# Patient Record
Sex: FEMALE | Race: WHITE | Employment: FULL TIME | URBAN - METROPOLITAN AREA
[De-identification: names, ages, dates, MRNs, and addresses within clinical notes are randomized per-mention and may not be internally consistent; named-entity substitution may affect disease eponyms.]

---

## 2017-07-03 ENCOUNTER — OFFICE VISIT (OUTPATIENT)
Dept: FAMILY MEDICINE CLINIC | Age: 57
End: 2017-07-03

## 2017-07-03 VITALS
DIASTOLIC BLOOD PRESSURE: 82 MMHG | SYSTOLIC BLOOD PRESSURE: 118 MMHG | HEIGHT: 66 IN | BODY MASS INDEX: 24.62 KG/M2 | TEMPERATURE: 97.8 F | WEIGHT: 153.2 LBS | RESPIRATION RATE: 18 BRPM | HEART RATE: 70 BPM

## 2017-07-03 DIAGNOSIS — E78.5 HYPERLIPIDEMIA, UNSPECIFIED HYPERLIPIDEMIA TYPE: ICD-10-CM

## 2017-07-03 DIAGNOSIS — E78.5 HYPERLIPIDEMIA, UNSPECIFIED HYPERLIPIDEMIA TYPE: Primary | ICD-10-CM

## 2017-07-03 LAB
ALBUMIN SERPL-MCNC: 5 G/DL (ref 3.9–4.9)
ALP BLD-CCNC: 104 U/L (ref 40–130)
ALT SERPL-CCNC: 17 U/L (ref 0–33)
ANION GAP SERPL CALCULATED.3IONS-SCNC: 13 MEQ/L (ref 7–13)
AST SERPL-CCNC: 19 U/L (ref 0–35)
BASOPHILS ABSOLUTE: 0 K/UL (ref 0–0.2)
BASOPHILS RELATIVE PERCENT: 0.7 %
BILIRUB SERPL-MCNC: 0.4 MG/DL (ref 0–1.2)
BUN BLDV-MCNC: 21 MG/DL (ref 6–20)
CALCIUM SERPL-MCNC: 9.4 MG/DL (ref 8.6–10.2)
CHLORIDE BLD-SCNC: 104 MEQ/L (ref 98–107)
CHOLESTEROL, TOTAL: 289 MG/DL (ref 0–199)
CO2: 25 MEQ/L (ref 22–29)
CREAT SERPL-MCNC: 0.62 MG/DL (ref 0.5–0.9)
EOSINOPHILS ABSOLUTE: 0.1 K/UL (ref 0–0.7)
EOSINOPHILS RELATIVE PERCENT: 1.8 %
GFR AFRICAN AMERICAN: >60
GFR NON-AFRICAN AMERICAN: >60
GLOBULIN: 1.6 G/DL (ref 2.3–3.5)
GLUCOSE BLD-MCNC: 106 MG/DL (ref 74–109)
HCT VFR BLD CALC: 43.3 % (ref 37–47)
HDLC SERPL-MCNC: 55 MG/DL (ref 40–59)
HEMOGLOBIN: 14.7 G/DL (ref 12–16)
LDL CHOLESTEROL CALCULATED: 208 MG/DL (ref 0–129)
LYMPHOCYTES ABSOLUTE: 2 K/UL (ref 1–4.8)
LYMPHOCYTES RELATIVE PERCENT: 29.9 %
MCH RBC QN AUTO: 31.6 PG (ref 27–31.3)
MCHC RBC AUTO-ENTMCNC: 33.9 % (ref 33–37)
MCV RBC AUTO: 93.3 FL (ref 82–100)
MONOCYTES ABSOLUTE: 0.4 K/UL (ref 0.2–0.8)
MONOCYTES RELATIVE PERCENT: 6 %
NEUTROPHILS ABSOLUTE: 4.2 K/UL (ref 1.4–6.5)
NEUTROPHILS RELATIVE PERCENT: 61.6 %
PDW BLD-RTO: 12.6 % (ref 11.5–14.5)
PLATELET # BLD: 181 K/UL (ref 130–400)
POTASSIUM SERPL-SCNC: 4 MEQ/L (ref 3.5–5.1)
RBC # BLD: 4.65 M/UL (ref 4.2–5.4)
SODIUM BLD-SCNC: 142 MEQ/L (ref 132–144)
TOTAL PROTEIN: 6.6 G/DL (ref 6.4–8.1)
TRIGL SERPL-MCNC: 132 MG/DL (ref 0–200)
WBC # BLD: 6.8 K/UL (ref 4.8–10.8)

## 2017-07-03 PROCEDURE — 99213 OFFICE O/P EST LOW 20 MIN: CPT | Performed by: FAMILY MEDICINE

## 2017-07-03 RX ORDER — BUPROPION HYDROCHLORIDE 150 MG/1
150 TABLET, EXTENDED RELEASE ORAL 2 TIMES DAILY
Qty: 60 TABLET | Refills: 3 | Status: SHIPPED | OUTPATIENT
Start: 2017-07-03 | End: 2018-12-27

## 2017-07-03 RX ORDER — IBUPROFEN 800 MG/1
800 TABLET ORAL EVERY 6 HOURS PRN
Qty: 120 TABLET | Refills: 3 | Status: SHIPPED | OUTPATIENT
Start: 2017-07-03 | End: 2018-12-27 | Stop reason: SDUPTHER

## 2017-07-03 ASSESSMENT — ENCOUNTER SYMPTOMS
SHORTNESS OF BREATH: 0
ABDOMINAL PAIN: 0
COUGH: 0
CONSTIPATION: 0
DIARRHEA: 0
EYES NEGATIVE: 1
NAUSEA: 0

## 2017-07-07 RX ORDER — ATORVASTATIN CALCIUM 40 MG/1
40 TABLET, FILM COATED ORAL DAILY
Qty: 30 TABLET | Refills: 1 | Status: SHIPPED | OUTPATIENT
Start: 2017-07-07 | End: 2018-12-27

## 2018-10-09 ENCOUNTER — OFFICE VISIT (OUTPATIENT)
Dept: FAMILY MEDICINE CLINIC | Age: 58
End: 2018-10-09
Payer: COMMERCIAL

## 2018-10-09 VITALS
RESPIRATION RATE: 16 BRPM | HEART RATE: 83 BPM | TEMPERATURE: 98.9 F | BODY MASS INDEX: 25.54 KG/M2 | WEIGHT: 158.9 LBS | OXYGEN SATURATION: 96 % | SYSTOLIC BLOOD PRESSURE: 110 MMHG | DIASTOLIC BLOOD PRESSURE: 80 MMHG | HEIGHT: 66 IN

## 2018-10-09 DIAGNOSIS — J01.10 ACUTE NON-RECURRENT FRONTAL SINUSITIS: ICD-10-CM

## 2018-10-09 DIAGNOSIS — M54.16 LUMBAR RADICULOPATHY: ICD-10-CM

## 2018-10-09 DIAGNOSIS — H73.012 BULLOUS MYRINGITIS OF LEFT EAR: Primary | ICD-10-CM

## 2018-10-09 DIAGNOSIS — M54.50 ACUTE MIDLINE LOW BACK PAIN WITHOUT SCIATICA: ICD-10-CM

## 2018-10-09 DIAGNOSIS — M54.2 CERVICALGIA: ICD-10-CM

## 2018-10-09 PROCEDURE — 99214 OFFICE O/P EST MOD 30 MIN: CPT | Performed by: NURSE PRACTITIONER

## 2018-10-09 RX ORDER — PREDNISONE 20 MG/1
40 TABLET ORAL DAILY
Qty: 6 TABLET | Refills: 0 | Status: SHIPPED | OUTPATIENT
Start: 2018-10-09 | End: 2018-10-12

## 2018-10-09 RX ORDER — TIZANIDINE 2 MG/1
TABLET ORAL
Qty: 30 TABLET | Refills: 0 | Status: SHIPPED | OUTPATIENT
Start: 2018-10-09 | End: 2018-10-18 | Stop reason: ALTCHOICE

## 2018-10-09 RX ORDER — AMOXICILLIN AND CLAVULANATE POTASSIUM 875; 125 MG/1; MG/1
1 TABLET, FILM COATED ORAL 2 TIMES DAILY
Qty: 20 TABLET | Refills: 0 | Status: SHIPPED | OUTPATIENT
Start: 2018-10-09 | End: 2018-10-19

## 2018-10-09 ASSESSMENT — PATIENT HEALTH QUESTIONNAIRE - PHQ9
SUM OF ALL RESPONSES TO PHQ9 QUESTIONS 1 & 2: 0
2. FEELING DOWN, DEPRESSED OR HOPELESS: 0
1. LITTLE INTEREST OR PLEASURE IN DOING THINGS: 0
SUM OF ALL RESPONSES TO PHQ QUESTIONS 1-9: 0
SUM OF ALL RESPONSES TO PHQ QUESTIONS 1-9: 0

## 2018-10-10 ASSESSMENT — ENCOUNTER SYMPTOMS
COUGH: 1
SORE THROAT: 1
BACK PAIN: 1

## 2018-10-18 ENCOUNTER — OFFICE VISIT (OUTPATIENT)
Dept: FAMILY MEDICINE CLINIC | Age: 58
End: 2018-10-18
Payer: COMMERCIAL

## 2018-10-18 VITALS
TEMPERATURE: 97.7 F | WEIGHT: 159 LBS | BODY MASS INDEX: 25.55 KG/M2 | SYSTOLIC BLOOD PRESSURE: 118 MMHG | HEART RATE: 89 BPM | DIASTOLIC BLOOD PRESSURE: 78 MMHG | HEIGHT: 66 IN | RESPIRATION RATE: 20 BRPM

## 2018-10-18 DIAGNOSIS — H65.02 ACUTE SEROUS OTITIS MEDIA OF LEFT EAR, RECURRENCE NOT SPECIFIED: ICD-10-CM

## 2018-10-18 DIAGNOSIS — H73.012 BULLOUS MYRINGITIS OF LEFT EAR: Primary | ICD-10-CM

## 2018-10-18 DIAGNOSIS — H93.8X2 EAR FULLNESS, LEFT: ICD-10-CM

## 2018-10-18 DIAGNOSIS — R05.9 COUGH: ICD-10-CM

## 2018-10-18 PROCEDURE — 99213 OFFICE O/P EST LOW 20 MIN: CPT | Performed by: FAMILY MEDICINE

## 2018-10-18 ASSESSMENT — ENCOUNTER SYMPTOMS
SINUS PRESSURE: 0
EYE ITCHING: 0
SHORTNESS OF BREATH: 0
SORE THROAT: 0
DIARRHEA: 0
ABDOMINAL PAIN: 0
COUGH: 1
CONSTIPATION: 0
EYE DISCHARGE: 0

## 2018-10-18 NOTE — PROGRESS NOTES
Subjective  Ventura Moulton, 60 y.o. female presents today with:  Chief Complaint   Patient presents with    Ear Fullness     Pt in office being seen for a follow up 10/09/18 she seen Anuja Rutledge for left ear ear and a cough. She was rx'ed Augmentin and Medrol dose pack- no help. She reports that she is still having experiencing left ear fullness and a cough           HPI    Patient follow-up ear infection still complains of left ear plugged and blocked denies pain    No other questions and or concerns for today's visit      Review of Systems   Constitutional: Negative for appetite change, fatigue and fever. HENT: Negative for congestion, ear pain, sinus pressure and sore throat. Left ear fullness     Eyes: Negative for discharge and itching. Respiratory: Positive for cough. Negative for shortness of breath. Cardiovascular: Negative for chest pain and palpitations. Gastrointestinal: Negative for abdominal pain, constipation and diarrhea. Endocrine: Negative for polydipsia. Genitourinary: Negative for difficulty urinating. Musculoskeletal: Negative for gait problem. Skin: Negative. Neurological: Negative for dizziness. Hematological: Negative. Psychiatric/Behavioral: Negative. Past Medical History:   Diagnosis Date    H/O Kidney stone 11/10/2016    Hyperlipidemia     Kidney stone 11/10/2016    Post-menopause 11/10/2016     Past Surgical History:   Procedure Laterality Date    HYSTERECTOMY  2006    JONG Morse     Social History     Social History    Marital status:      Spouse name: N/A    Number of children: N/A    Years of education: N/A     Occupational History    Not on file.      Social History Main Topics    Smoking status: Current Every Day Smoker     Packs/day: 1.00     Years: 40.00     Types: Cigarettes    Smokeless tobacco: Never Used    Alcohol use No    Drug use: No    Sexual activity: No     Other Topics Concern    Not on file     Social History Narrative    No narrative on file     History reviewed. No pertinent family history. Allergies   Allergen Reactions    Adhesive Tape Itching     bumps    Sulfamethoxazole-Trimethoprim Diarrhea and Nausea And Vomiting     Current Outpatient Prescriptions   Medication Sig Dispense Refill    amoxicillin-clavulanate (AUGMENTIN) 875-125 MG per tablet Take 1 tablet by mouth 2 times daily for 10 days 20 tablet 0    atorvastatin (LIPITOR) 40 MG tablet Take 1 tablet by mouth daily 30 tablet 1    ibuprofen (ADVIL;MOTRIN) 800 MG tablet Take 1 tablet by mouth every 6 hours as needed for Pain 120 tablet 3    buPROPion (WELLBUTRIN SR) 150 MG extended release tablet Take 1 tablet by mouth 2 times daily 60 tablet 3    acetaminophen (TYLENOL) 500 MG tablet Take 500 mg by mouth daily       No current facility-administered medications for this visit. PMH, Surgical Hx, Family Hx, and Social Hx reviewed and updated. Health Maintenance reviewed. Objective    Vitals:    10/18/18 1508   BP: 118/78   Pulse: 89   Resp: 20   Temp: 97.7 °F (36.5 °C)   TempSrc: Temporal   Weight: 159 lb (72.1 kg)   Height: 5' 6\" (1.676 m)       Physical Exam   Constitutional: She is oriented to person, place, and time. She appears well-developed and well-nourished. HENT:   Head: Normocephalic. Mouth/Throat: Oropharynx is clear and moist.   Eyes: Pupils are equal, round, and reactive to light. Neck: Normal range of motion. Neck supple. No thyromegaly present. Cardiovascular: Normal rate and intact distal pulses. Exam reveals no gallop and no friction rub. No murmur heard. Pulmonary/Chest: Effort normal and breath sounds normal.   Abdominal: Soft. Bowel sounds are normal.   Musculoskeletal: Normal range of motion. Neurological: She is alert and oriented to person, place, and time. Skin: Skin is warm and dry. Psychiatric: She has a normal mood and affect.  Her behavior is normal.     She has evidence of

## 2018-12-27 ENCOUNTER — OFFICE VISIT (OUTPATIENT)
Dept: FAMILY MEDICINE CLINIC | Age: 58
End: 2018-12-27
Payer: COMMERCIAL

## 2018-12-27 VITALS
TEMPERATURE: 97.7 F | HEIGHT: 66 IN | SYSTOLIC BLOOD PRESSURE: 118 MMHG | DIASTOLIC BLOOD PRESSURE: 72 MMHG | WEIGHT: 163 LBS | HEART RATE: 77 BPM | BODY MASS INDEX: 26.2 KG/M2 | RESPIRATION RATE: 20 BRPM

## 2018-12-27 DIAGNOSIS — M54.42 BILATERAL LOW BACK PAIN WITH BILATERAL SCIATICA, UNSPECIFIED CHRONICITY: Primary | ICD-10-CM

## 2018-12-27 DIAGNOSIS — M79.10 MYALGIA: ICD-10-CM

## 2018-12-27 DIAGNOSIS — M54.41 BILATERAL LOW BACK PAIN WITH BILATERAL SCIATICA, UNSPECIFIED CHRONICITY: Primary | ICD-10-CM

## 2018-12-27 DIAGNOSIS — M70.61 TROCHANTERIC BURSITIS OF BOTH HIPS: ICD-10-CM

## 2018-12-27 DIAGNOSIS — M70.62 TROCHANTERIC BURSITIS OF BOTH HIPS: ICD-10-CM

## 2018-12-27 DIAGNOSIS — E78.5 HYPERLIPIDEMIA, UNSPECIFIED HYPERLIPIDEMIA TYPE: ICD-10-CM

## 2018-12-27 DIAGNOSIS — Z78.0 POST-MENOPAUSE: ICD-10-CM

## 2018-12-27 LAB
ALBUMIN SERPL-MCNC: 4.5 G/DL (ref 3.9–4.9)
ALP BLD-CCNC: 103 U/L (ref 40–130)
ALT SERPL-CCNC: 16 U/L (ref 0–33)
ANION GAP SERPL CALCULATED.3IONS-SCNC: 11 MEQ/L (ref 7–13)
AST SERPL-CCNC: 18 U/L (ref 0–35)
BASOPHILS ABSOLUTE: 0.1 K/UL (ref 0–0.2)
BASOPHILS RELATIVE PERCENT: 0.7 %
BILIRUB SERPL-MCNC: 0.3 MG/DL (ref 0–1.2)
BUN BLDV-MCNC: 15 MG/DL (ref 6–20)
CALCIUM SERPL-MCNC: 9.2 MG/DL (ref 8.6–10.2)
CHLORIDE BLD-SCNC: 100 MEQ/L (ref 98–107)
CHOLESTEROL, TOTAL: 251 MG/DL (ref 0–199)
CO2: 29 MEQ/L (ref 22–29)
CREAT SERPL-MCNC: 0.73 MG/DL (ref 0.5–0.9)
EOSINOPHILS ABSOLUTE: 0.1 K/UL (ref 0–0.7)
EOSINOPHILS RELATIVE PERCENT: 2 %
GFR AFRICAN AMERICAN: >60
GFR NON-AFRICAN AMERICAN: >60
GLOBULIN: 2.6 G/DL (ref 2.3–3.5)
GLUCOSE BLD-MCNC: 101 MG/DL (ref 74–109)
HCT VFR BLD CALC: 42.4 % (ref 37–47)
HDLC SERPL-MCNC: 50 MG/DL (ref 40–59)
HEMOGLOBIN: 14.6 G/DL (ref 12–16)
LDL CHOLESTEROL CALCULATED: 173 MG/DL (ref 0–129)
LYMPHOCYTES ABSOLUTE: 1.8 K/UL (ref 1–4.8)
LYMPHOCYTES RELATIVE PERCENT: 26.1 %
MCH RBC QN AUTO: 32.8 PG (ref 27–31.3)
MCHC RBC AUTO-ENTMCNC: 34.4 % (ref 33–37)
MCV RBC AUTO: 95.5 FL (ref 82–100)
MONOCYTES ABSOLUTE: 0.4 K/UL (ref 0.2–0.8)
MONOCYTES RELATIVE PERCENT: 5.3 %
NEUTROPHILS ABSOLUTE: 4.5 K/UL (ref 1.4–6.5)
NEUTROPHILS RELATIVE PERCENT: 65.9 %
PDW BLD-RTO: 12.4 % (ref 11.5–14.5)
PLATELET # BLD: 199 K/UL (ref 130–400)
POTASSIUM SERPL-SCNC: 4.5 MEQ/L (ref 3.5–5.1)
RBC # BLD: 4.44 M/UL (ref 4.2–5.4)
SODIUM BLD-SCNC: 140 MEQ/L (ref 132–144)
TOTAL PROTEIN: 7.1 G/DL (ref 6.4–8.1)
TRIGL SERPL-MCNC: 140 MG/DL (ref 0–200)
VITAMIN D 25-HYDROXY: 32.1 NG/ML (ref 30–100)
WBC # BLD: 6.9 K/UL (ref 4.8–10.8)

## 2018-12-27 PROCEDURE — 4004F PT TOBACCO SCREEN RCVD TLK: CPT | Performed by: FAMILY MEDICINE

## 2018-12-27 PROCEDURE — G8427 DOCREV CUR MEDS BY ELIG CLIN: HCPCS | Performed by: FAMILY MEDICINE

## 2018-12-27 PROCEDURE — G8419 CALC BMI OUT NRM PARAM NOF/U: HCPCS | Performed by: FAMILY MEDICINE

## 2018-12-27 PROCEDURE — 3017F COLORECTAL CA SCREEN DOC REV: CPT | Performed by: FAMILY MEDICINE

## 2018-12-27 PROCEDURE — G8484 FLU IMMUNIZE NO ADMIN: HCPCS | Performed by: FAMILY MEDICINE

## 2018-12-27 PROCEDURE — 99214 OFFICE O/P EST MOD 30 MIN: CPT | Performed by: FAMILY MEDICINE

## 2018-12-27 RX ORDER — IBUPROFEN 800 MG/1
800 TABLET ORAL EVERY 6 HOURS PRN
Qty: 120 TABLET | Refills: 3 | Status: ON HOLD | OUTPATIENT
Start: 2018-12-27 | End: 2019-09-29

## 2018-12-27 RX ORDER — TIZANIDINE 4 MG/1
4 TABLET ORAL 3 TIMES DAILY
Qty: 30 TABLET | Refills: 1 | Status: ON HOLD | OUTPATIENT
Start: 2018-12-27 | End: 2019-09-29

## 2018-12-27 RX ORDER — PREDNISONE 10 MG/1
TABLET ORAL
Qty: 51 TABLET | Refills: 0 | Status: SHIPPED | OUTPATIENT
Start: 2018-12-27 | End: 2019-01-06

## 2018-12-27 ASSESSMENT — ENCOUNTER SYMPTOMS
EYE DISCHARGE: 0
ABDOMINAL PAIN: 0
SHORTNESS OF BREATH: 0
SINUS PRESSURE: 0
DIARRHEA: 0
BACK PAIN: 1
CONSTIPATION: 0
SORE THROAT: 0
EYE ITCHING: 0
COUGH: 0

## 2018-12-27 NOTE — PROGRESS NOTES
pain over the bilateral greater trochanteric bursa is no swelling just inflammation mild inflammation of the musculature inferior lateral by inferior negative straight leg raises  Assessment & Plan    Diagnosis Orders   1. Bilateral low back pain with bilateral sciatica, unspecified chronicity  ibuprofen (ADVIL;MOTRIN) 800 MG tablet    XR LUMBAR SPINE (2-3 VIEWS)   2. Post-menopause  DEXA BONE DENSITY AXIAL SKELETON   3. Myalgia  Vitamin D 25 Hydroxy   4. Hyperlipidemia, unspecified hyperlipidemia type  CBC With Auto Differential    Comprehensive Metabolic Panel    Lipid Panel   5.  Trochanteric bursitis of both hips  XR LUMBAR SPINE (2-3 VIEWS)     Orders Placed This Encounter   Procedures    DEXA BONE DENSITY AXIAL SKELETON     Standing Status:   Future     Standing Expiration Date:   12/27/2019     Order Specific Question:   Reason for exam:     Answer:   screening    XR LUMBAR SPINE (2-3 VIEWS)     Order Specific Question:   Reason for exam:     Answer:   low back pain    Vitamin D 25 Hydroxy     Standing Status:   Future     Number of Occurrences:   1     Standing Expiration Date:   12/27/2019    CBC With Auto Differential     Standing Status:   Future     Number of Occurrences:   1     Standing Expiration Date:   12/27/2019    Comprehensive Metabolic Panel     Standing Status:   Future     Number of Occurrences:   1     Standing Expiration Date:   12/27/2019    Lipid Panel     Standing Status:   Future     Number of Occurrences:   1     Standing Expiration Date:   12/27/2019     Order Specific Question:   Is Patient Fasting?/# of Hours     Answer:   na     Orders Placed This Encounter   Medications    ibuprofen (ADVIL;MOTRIN) 800 MG tablet     Sig: Take 1 tablet by mouth every 6 hours as needed for Pain     Dispense:  120 tablet     Refill:  3     Medications Discontinued During This Encounter   Medication Reason    atorvastatin (LIPITOR) 40 MG tablet Paradoxical response    buPROPion (WELLBUTRIN SR)

## 2018-12-28 ENCOUNTER — TELEPHONE (OUTPATIENT)
Dept: FAMILY MEDICINE CLINIC | Age: 58
End: 2018-12-28

## 2018-12-28 NOTE — TELEPHONE ENCOUNTER
Pt is calling back in regards to the following message:      Cholesterol better than last time but still markedly elevated.  Recommend starting Lipitor once a day      Pt aware.  Would like the Lipitor sent to [facility] on [address]  Please create med with dosage and directions and send to pharmacy  Aware you are out of the office today

## 2018-12-29 RX ORDER — ATORVASTATIN CALCIUM 40 MG/1
40 TABLET, FILM COATED ORAL DAILY
Qty: 30 TABLET | Refills: 3 | Status: ON HOLD | OUTPATIENT
Start: 2018-12-29 | End: 2019-09-29

## 2019-01-14 ENCOUNTER — HOSPITAL ENCOUNTER (OUTPATIENT)
Dept: WOMENS IMAGING | Age: 59
Discharge: HOME | End: 2019-01-16
Payer: COMMERCIAL

## 2019-01-14 DIAGNOSIS — Z78.0 POST-MENOPAUSE: ICD-10-CM

## 2019-01-14 PROCEDURE — 77080 DXA BONE DENSITY AXIAL: CPT

## 2019-01-22 ENCOUNTER — TELEPHONE (OUTPATIENT)
Dept: FAMILY MEDICINE CLINIC | Age: 59
End: 2019-01-22

## 2019-01-23 ENCOUNTER — TELEPHONE (OUTPATIENT)
Dept: FAMILY MEDICINE CLINIC | Age: 59
End: 2019-01-23

## 2019-01-23 ENCOUNTER — OFFICE VISIT (OUTPATIENT)
Dept: FAMILY MEDICINE CLINIC | Age: 59
End: 2019-01-23
Payer: COMMERCIAL

## 2019-01-23 VITALS
HEART RATE: 73 BPM | SYSTOLIC BLOOD PRESSURE: 118 MMHG | BODY MASS INDEX: 27 KG/M2 | HEIGHT: 66 IN | TEMPERATURE: 97.3 F | DIASTOLIC BLOOD PRESSURE: 64 MMHG | WEIGHT: 168 LBS | RESPIRATION RATE: 16 BRPM

## 2019-01-23 DIAGNOSIS — M85.80 OSTEOPENIA, UNSPECIFIED LOCATION: ICD-10-CM

## 2019-01-23 DIAGNOSIS — E78.49 OTHER HYPERLIPIDEMIA: Primary | ICD-10-CM

## 2019-01-23 DIAGNOSIS — R31.9 HEMATURIA, UNSPECIFIED TYPE: ICD-10-CM

## 2019-01-23 DIAGNOSIS — N39.43 DRIBBLING OF URINE: ICD-10-CM

## 2019-01-23 LAB
BILIRUBIN, POC: NORMAL
BLOOD URINE, POC: NORMAL
CLARITY, POC: CLEAR
COLOR, POC: NORMAL
GLUCOSE URINE, POC: NORMAL
KETONES, POC: NORMAL
LEUKOCYTE EST, POC: NORMAL
NITRITE, POC: NORMAL
PH, POC: 6
PROTEIN, POC: NORMAL
SPECIFIC GRAVITY, POC: 1.01
UROBILINOGEN, POC: NORMAL

## 2019-01-23 PROCEDURE — 74176 CT ABD & PELVIS W/O CONTRAST: CPT | Performed by: FAMILY MEDICINE

## 2019-01-23 PROCEDURE — 4004F PT TOBACCO SCREEN RCVD TLK: CPT | Performed by: FAMILY MEDICINE

## 2019-01-23 PROCEDURE — 99214 OFFICE O/P EST MOD 30 MIN: CPT | Performed by: FAMILY MEDICINE

## 2019-01-23 PROCEDURE — 81002 URINALYSIS NONAUTO W/O SCOPE: CPT | Performed by: FAMILY MEDICINE

## 2019-01-23 PROCEDURE — G8427 DOCREV CUR MEDS BY ELIG CLIN: HCPCS | Performed by: FAMILY MEDICINE

## 2019-01-23 PROCEDURE — G8484 FLU IMMUNIZE NO ADMIN: HCPCS | Performed by: FAMILY MEDICINE

## 2019-01-23 PROCEDURE — G8419 CALC BMI OUT NRM PARAM NOF/U: HCPCS | Performed by: FAMILY MEDICINE

## 2019-01-23 PROCEDURE — 3017F COLORECTAL CA SCREEN DOC REV: CPT | Performed by: FAMILY MEDICINE

## 2019-01-23 RX ORDER — RISEDRONATE SODIUM 150 MG/1
150 TABLET, FILM COATED ORAL
Qty: 3 TABLET | Refills: 3 | Status: ON HOLD | OUTPATIENT
Start: 2019-01-23 | End: 2019-09-29

## 2019-01-23 ASSESSMENT — ENCOUNTER SYMPTOMS
SINUS PRESSURE: 0
DIARRHEA: 0
EYE ITCHING: 0
SORE THROAT: 0
SHORTNESS OF BREATH: 0
CONSTIPATION: 0
ABDOMINAL PAIN: 0
COUGH: 0
EYE DISCHARGE: 0

## 2019-02-02 ENCOUNTER — HOSPITAL ENCOUNTER (OUTPATIENT)
Dept: CT IMAGING | Age: 59
Discharge: HOME | End: 2019-02-04
Payer: COMMERCIAL

## 2019-02-02 DIAGNOSIS — R31.9 HEMATURIA, UNSPECIFIED TYPE: ICD-10-CM

## 2019-02-02 PROCEDURE — 74176 CT ABD & PELVIS W/O CONTRAST: CPT

## 2019-02-05 ENCOUNTER — TELEPHONE (OUTPATIENT)
Dept: FAMILY MEDICINE CLINIC | Age: 59
End: 2019-02-05

## 2019-02-07 ENCOUNTER — TELEPHONE (OUTPATIENT)
Dept: FAMILY MEDICINE CLINIC | Age: 59
End: 2019-02-07

## 2019-02-07 RX ORDER — BUPROPION HYDROCHLORIDE 150 MG/1
150 TABLET, EXTENDED RELEASE ORAL 2 TIMES DAILY
Qty: 60 TABLET | Refills: 3 | Status: ON HOLD | OUTPATIENT
Start: 2019-02-07 | End: 2019-09-29

## 2019-02-13 ENCOUNTER — TELEPHONE (OUTPATIENT)
Dept: FAMILY MEDICINE CLINIC | Age: 59
End: 2019-02-13

## 2019-02-13 DIAGNOSIS — R16.0 LIVER MASS: Primary | ICD-10-CM

## 2019-02-24 ENCOUNTER — HOSPITAL ENCOUNTER (OUTPATIENT)
Dept: ULTRASOUND IMAGING | Age: 59
Discharge: HOME | End: 2019-02-26
Payer: COMMERCIAL

## 2019-02-24 DIAGNOSIS — R16.0 LIVER MASS: ICD-10-CM

## 2019-02-24 PROCEDURE — 76830 TRANSVAGINAL US NON-OB: CPT

## 2019-02-24 PROCEDURE — 76856 US EXAM PELVIC COMPLETE: CPT

## 2019-02-24 PROCEDURE — 76705 ECHO EXAM OF ABDOMEN: CPT

## 2019-02-26 ENCOUNTER — TELEPHONE (OUTPATIENT)
Dept: FAMILY MEDICINE CLINIC | Age: 59
End: 2019-02-26

## 2019-03-13 ENCOUNTER — TELEPHONE (OUTPATIENT)
Dept: OTHER | Facility: CLINIC | Age: 59
End: 2019-03-13

## 2019-09-29 ENCOUNTER — APPOINTMENT (OUTPATIENT)
Dept: GENERAL RADIOLOGY | Age: 59
DRG: 103 | End: 2019-09-29
Payer: COMMERCIAL

## 2019-09-29 ENCOUNTER — APPOINTMENT (OUTPATIENT)
Dept: CT IMAGING | Age: 59
DRG: 103 | End: 2019-09-29
Payer: COMMERCIAL

## 2019-09-29 ENCOUNTER — HOSPITAL ENCOUNTER (INPATIENT)
Age: 59
LOS: 1 days | Discharge: LEFT BEFORE DISCHARGE | DRG: 103 | End: 2019-09-30
Attending: INTERNAL MEDICINE | Admitting: INTERNAL MEDICINE
Payer: COMMERCIAL

## 2019-09-29 DIAGNOSIS — R51.9 INTRACTABLE HEADACHE, UNSPECIFIED CHRONICITY PATTERN, UNSPECIFIED HEADACHE TYPE: Primary | ICD-10-CM

## 2019-09-29 PROBLEM — G43.819 MIGRAINE VARIANT, INTRACTABLE: Status: ACTIVE | Noted: 2019-09-29

## 2019-09-29 LAB
ALBUMIN SERPL-MCNC: 4.4 G/DL (ref 3.5–4.6)
ALP BLD-CCNC: 97 U/L (ref 40–130)
ALT SERPL-CCNC: 16 U/L (ref 0–33)
AMPHETAMINE SCREEN, URINE: NORMAL
ANION GAP SERPL CALCULATED.3IONS-SCNC: 14 MEQ/L (ref 9–15)
APTT: 28.1 SEC (ref 24.4–36.8)
AST SERPL-CCNC: 25 U/L (ref 0–35)
BARBITURATE SCREEN URINE: NORMAL
BASOPHILS ABSOLUTE: 0.1 K/UL (ref 0–0.2)
BASOPHILS RELATIVE PERCENT: 1.2 %
BENZODIAZEPINE SCREEN, URINE: NORMAL
BILIRUB SERPL-MCNC: <0.2 MG/DL (ref 0.2–0.7)
BUN BLDV-MCNC: 11 MG/DL (ref 8–23)
CALCIUM SERPL-MCNC: 9.4 MG/DL (ref 8.5–9.9)
CANNABINOID SCREEN URINE: NORMAL
CHLORIDE BLD-SCNC: 103 MEQ/L (ref 95–107)
CO2: 25 MEQ/L (ref 20–31)
COCAINE METABOLITE SCREEN URINE: NORMAL
CREAT SERPL-MCNC: 0.59 MG/DL (ref 0.5–0.9)
EKG ATRIAL RATE: 76 BPM
EKG P AXIS: 73 DEGREES
EKG P-R INTERVAL: 208 MS
EKG Q-T INTERVAL: 404 MS
EKG QRS DURATION: 84 MS
EKG QTC CALCULATION (BAZETT): 454 MS
EKG R AXIS: 76 DEGREES
EKG T AXIS: 60 DEGREES
EKG VENTRICULAR RATE: 76 BPM
EOSINOPHILS ABSOLUTE: 0.1 K/UL (ref 0–0.7)
EOSINOPHILS RELATIVE PERCENT: 1.4 %
ETHANOL PERCENT: NORMAL G/DL
ETHANOL: <10 MG/DL (ref 0–0.08)
GFR AFRICAN AMERICAN: >60
GFR NON-AFRICAN AMERICAN: >60
GLOBULIN: 2.6 G/DL (ref 2.3–3.5)
GLUCOSE BLD-MCNC: 140 MG/DL (ref 60–115)
GLUCOSE BLD-MCNC: 150 MG/DL (ref 70–99)
HCT VFR BLD CALC: 41.7 % (ref 37–47)
HEMOGLOBIN: 14.7 G/DL (ref 12–16)
INR BLD: 0.9
LYMPHOCYTES ABSOLUTE: 2.1 K/UL (ref 1–4.8)
LYMPHOCYTES RELATIVE PERCENT: 26.4 %
Lab: NORMAL
MAGNESIUM: 1.7 MG/DL (ref 1.7–2.4)
MCH RBC QN AUTO: 32.9 PG (ref 27–31.3)
MCHC RBC AUTO-ENTMCNC: 35.2 % (ref 33–37)
MCV RBC AUTO: 93.5 FL (ref 82–100)
METHADONE SCREEN, URINE: NORMAL
MONOCYTES ABSOLUTE: 0.4 K/UL (ref 0.2–0.8)
MONOCYTES RELATIVE PERCENT: 4.5 %
NEUTROPHILS ABSOLUTE: 5.2 K/UL (ref 1.4–6.5)
NEUTROPHILS RELATIVE PERCENT: 66.5 %
OPIATE SCREEN URINE: NORMAL
OXYCODONE URINE: NORMAL
PDW BLD-RTO: 12.8 % (ref 11.5–14.5)
PERFORMED ON: ABNORMAL
PHENCYCLIDINE SCREEN URINE: NORMAL
PLATELET # BLD: 195 K/UL (ref 130–400)
POTASSIUM SERPL-SCNC: 3.6 MEQ/L (ref 3.4–4.9)
PROPOXYPHENE SCREEN: NORMAL
PROTHROMBIN TIME: 12 SEC (ref 12.3–14.9)
RBC # BLD: 4.46 M/UL (ref 4.2–5.4)
SODIUM BLD-SCNC: 142 MEQ/L (ref 135–144)
TOTAL CK: 44 U/L (ref 0–170)
TOTAL PROTEIN: 7 G/DL (ref 6.3–8)
TROPONIN: <0.01 NG/ML (ref 0–0.01)
WBC # BLD: 7.9 K/UL (ref 4.8–10.8)

## 2019-09-29 PROCEDURE — 85025 COMPLETE CBC W/AUTO DIFF WBC: CPT

## 2019-09-29 PROCEDURE — 96375 TX/PRO/DX INJ NEW DRUG ADDON: CPT

## 2019-09-29 PROCEDURE — 82550 ASSAY OF CK (CPK): CPT

## 2019-09-29 PROCEDURE — 99285 EMERGENCY DEPT VISIT HI MDM: CPT

## 2019-09-29 PROCEDURE — 85610 PROTHROMBIN TIME: CPT

## 2019-09-29 PROCEDURE — 96365 THER/PROPH/DIAG IV INF INIT: CPT

## 2019-09-29 PROCEDURE — 93005 ELECTROCARDIOGRAM TRACING: CPT | Performed by: NURSE PRACTITIONER

## 2019-09-29 PROCEDURE — 36415 COLL VENOUS BLD VENIPUNCTURE: CPT

## 2019-09-29 PROCEDURE — 70450 CT HEAD/BRAIN W/O DYE: CPT

## 2019-09-29 PROCEDURE — G0378 HOSPITAL OBSERVATION PER HR: HCPCS

## 2019-09-29 PROCEDURE — 84484 ASSAY OF TROPONIN QUANT: CPT

## 2019-09-29 PROCEDURE — 83735 ASSAY OF MAGNESIUM: CPT

## 2019-09-29 PROCEDURE — 87086 URINE CULTURE/COLONY COUNT: CPT

## 2019-09-29 PROCEDURE — G0480 DRUG TEST DEF 1-7 CLASSES: HCPCS

## 2019-09-29 PROCEDURE — 6360000002 HC RX W HCPCS: Performed by: NURSE PRACTITIONER

## 2019-09-29 PROCEDURE — 2580000003 HC RX 258: Performed by: NURSE PRACTITIONER

## 2019-09-29 PROCEDURE — 71045 X-RAY EXAM CHEST 1 VIEW: CPT

## 2019-09-29 PROCEDURE — 80307 DRUG TEST PRSMV CHEM ANLYZR: CPT

## 2019-09-29 PROCEDURE — 85730 THROMBOPLASTIN TIME PARTIAL: CPT

## 2019-09-29 PROCEDURE — 96366 THER/PROPH/DIAG IV INF ADDON: CPT

## 2019-09-29 PROCEDURE — 80053 COMPREHEN METABOLIC PANEL: CPT

## 2019-09-29 PROCEDURE — 1210000000 HC MED SURG R&B

## 2019-09-29 RX ORDER — METOCLOPRAMIDE HYDROCHLORIDE 5 MG/ML
10 INJECTION INTRAMUSCULAR; INTRAVENOUS ONCE
Status: COMPLETED | OUTPATIENT
Start: 2019-09-29 | End: 2019-09-30

## 2019-09-29 RX ORDER — SODIUM CHLORIDE 0.9 % (FLUSH) 0.9 %
10 SYRINGE (ML) INJECTION EVERY 12 HOURS SCHEDULED
Status: DISCONTINUED | OUTPATIENT
Start: 2019-09-29 | End: 2019-09-30 | Stop reason: HOSPADM

## 2019-09-29 RX ORDER — MAGNESIUM SULFATE IN WATER 40 MG/ML
2 INJECTION, SOLUTION INTRAVENOUS ONCE
Status: COMPLETED | OUTPATIENT
Start: 2019-09-29 | End: 2019-09-29

## 2019-09-29 RX ORDER — ONDANSETRON 2 MG/ML
4 INJECTION INTRAMUSCULAR; INTRAVENOUS EVERY 6 HOURS PRN
Status: DISCONTINUED | OUTPATIENT
Start: 2019-09-29 | End: 2019-09-30 | Stop reason: HOSPADM

## 2019-09-29 RX ORDER — CETIRIZINE HYDROCHLORIDE 10 MG/1
10 TABLET ORAL DAILY
Status: DISCONTINUED | OUTPATIENT
Start: 2019-09-30 | End: 2019-09-30 | Stop reason: HOSPADM

## 2019-09-29 RX ORDER — CYCLOBENZAPRINE HCL 5 MG
5 TABLET ORAL NIGHTLY
Status: DISCONTINUED | OUTPATIENT
Start: 2019-09-29 | End: 2019-09-30 | Stop reason: HOSPADM

## 2019-09-29 RX ORDER — ATORVASTATIN CALCIUM 40 MG/1
40 TABLET, FILM COATED ORAL DAILY
Status: DISCONTINUED | OUTPATIENT
Start: 2019-09-30 | End: 2019-09-29

## 2019-09-29 RX ORDER — 0.9 % SODIUM CHLORIDE 0.9 %
1000 INTRAVENOUS SOLUTION INTRAVENOUS ONCE
Status: COMPLETED | OUTPATIENT
Start: 2019-09-29 | End: 2019-09-29

## 2019-09-29 RX ORDER — DIPHENHYDRAMINE HYDROCHLORIDE 50 MG/ML
25 INJECTION INTRAMUSCULAR; INTRAVENOUS ONCE
Status: COMPLETED | OUTPATIENT
Start: 2019-09-29 | End: 2019-09-29

## 2019-09-29 RX ORDER — KETOROLAC TROMETHAMINE 30 MG/ML
30 INJECTION, SOLUTION INTRAMUSCULAR; INTRAVENOUS ONCE
Status: COMPLETED | OUTPATIENT
Start: 2019-09-29 | End: 2019-09-29

## 2019-09-29 RX ORDER — BUPROPION HYDROCHLORIDE 150 MG/1
150 TABLET, EXTENDED RELEASE ORAL 2 TIMES DAILY
Status: DISCONTINUED | OUTPATIENT
Start: 2019-09-30 | End: 2019-09-29

## 2019-09-29 RX ORDER — METOCLOPRAMIDE HYDROCHLORIDE 5 MG/ML
10 INJECTION INTRAMUSCULAR; INTRAVENOUS ONCE
Status: COMPLETED | OUTPATIENT
Start: 2019-09-29 | End: 2019-09-29

## 2019-09-29 RX ORDER — IBUPROFEN 200 MG
200 TABLET ORAL 2 TIMES DAILY PRN
COMMUNITY

## 2019-09-29 RX ORDER — SODIUM CHLORIDE 0.9 % (FLUSH) 0.9 %
10 SYRINGE (ML) INJECTION PRN
Status: DISCONTINUED | OUTPATIENT
Start: 2019-09-29 | End: 2019-09-30 | Stop reason: HOSPADM

## 2019-09-29 RX ORDER — DIPHENHYDRAMINE HYDROCHLORIDE 50 MG/ML
25 INJECTION INTRAMUSCULAR; INTRAVENOUS ONCE
Status: COMPLETED | OUTPATIENT
Start: 2019-09-29 | End: 2019-09-30

## 2019-09-29 RX ORDER — SUMATRIPTAN 6 MG/.5ML
6 INJECTION, SOLUTION SUBCUTANEOUS ONCE
Status: COMPLETED | OUTPATIENT
Start: 2019-09-29 | End: 2019-09-30

## 2019-09-29 RX ADMIN — DIPHENHYDRAMINE HYDROCHLORIDE 25 MG: 50 INJECTION, SOLUTION INTRAMUSCULAR; INTRAVENOUS at 18:40

## 2019-09-29 RX ADMIN — MAGNESIUM SULFATE HEPTAHYDRATE 2 G: 40 INJECTION, SOLUTION INTRAVENOUS at 20:22

## 2019-09-29 RX ADMIN — SODIUM CHLORIDE 1000 ML: 9 INJECTION, SOLUTION INTRAVENOUS at 18:40

## 2019-09-29 RX ADMIN — KETOROLAC TROMETHAMINE 30 MG: 30 INJECTION, SOLUTION INTRAMUSCULAR at 20:22

## 2019-09-29 RX ADMIN — METOCLOPRAMIDE 10 MG: 5 INJECTION, SOLUTION INTRAMUSCULAR; INTRAVENOUS at 18:40

## 2019-09-29 ASSESSMENT — ENCOUNTER SYMPTOMS
ABDOMINAL PAIN: 0
DIARRHEA: 0
COUGH: 0
PHOTOPHOBIA: 0
VOMITING: 0
SORE THROAT: 0
NAUSEA: 1
SHORTNESS OF BREATH: 0
RHINORRHEA: 0
BACK PAIN: 0
EYE PAIN: 0

## 2019-09-29 ASSESSMENT — PAIN DESCRIPTION - PAIN TYPE
TYPE: ACUTE PAIN

## 2019-09-29 ASSESSMENT — PAIN DESCRIPTION - LOCATION
LOCATION: HEAD

## 2019-09-29 ASSESSMENT — PAIN SCALES - GENERAL
PAINLEVEL_OUTOF10: 10

## 2019-09-29 ASSESSMENT — PAIN DESCRIPTION - FREQUENCY
FREQUENCY: CONTINUOUS

## 2019-09-29 ASSESSMENT — PAIN DESCRIPTION - DESCRIPTORS
DESCRIPTORS: HEADACHE
DESCRIPTORS: HEADACHE;ACHING;PRESSURE
DESCRIPTORS: HEADACHE

## 2019-09-30 VITALS
BODY MASS INDEX: 26.52 KG/M2 | TEMPERATURE: 99 F | OXYGEN SATURATION: 96 % | WEIGHT: 165 LBS | HEART RATE: 75 BPM | HEIGHT: 66 IN | RESPIRATION RATE: 16 BRPM | DIASTOLIC BLOOD PRESSURE: 65 MMHG | SYSTOLIC BLOOD PRESSURE: 107 MMHG

## 2019-09-30 LAB
INR BLD: 1
PERFORMED ON: NORMAL
POC SAMPLE TYPE: NORMAL
PROTHROMBIN TIME, POC: 12.5 SEC (ref 9.5–12.5)

## 2019-09-30 PROCEDURE — 2580000003 HC RX 258: Performed by: INTERNAL MEDICINE

## 2019-09-30 PROCEDURE — G0378 HOSPITAL OBSERVATION PER HR: HCPCS

## 2019-09-30 PROCEDURE — 6360000002 HC RX W HCPCS: Performed by: INTERNAL MEDICINE

## 2019-09-30 PROCEDURE — 96372 THER/PROPH/DIAG INJ SC/IM: CPT

## 2019-09-30 PROCEDURE — 6370000000 HC RX 637 (ALT 250 FOR IP): Performed by: INTERNAL MEDICINE

## 2019-09-30 PROCEDURE — 96376 TX/PRO/DX INJ SAME DRUG ADON: CPT

## 2019-09-30 RX ORDER — CYCLOBENZAPRINE HCL 5 MG
5 TABLET ORAL NIGHTLY
Qty: 10 TABLET | Refills: 0 | Status: SHIPPED | OUTPATIENT
Start: 2019-09-30 | End: 2019-10-10

## 2019-09-30 RX ADMIN — SUMATRIPTAN SUCCINATE 6 MG: 6 INJECTION SUBCUTANEOUS at 00:08

## 2019-09-30 RX ADMIN — Medication 10 ML: at 00:09

## 2019-09-30 RX ADMIN — CYCLOBENZAPRINE HYDROCHLORIDE 5 MG: 5 TABLET, FILM COATED ORAL at 00:08

## 2019-09-30 RX ADMIN — DIPHENHYDRAMINE HYDROCHLORIDE 25 MG: 50 INJECTION, SOLUTION INTRAMUSCULAR; INTRAVENOUS at 00:07

## 2019-09-30 RX ADMIN — CETIRIZINE HYDROCHLORIDE 10 MG: 10 TABLET, FILM COATED ORAL at 09:16

## 2019-09-30 RX ADMIN — METOCLOPRAMIDE 10 MG: 5 INJECTION, SOLUTION INTRAMUSCULAR; INTRAVENOUS at 00:07

## 2019-10-01 LAB — URINE CULTURE, ROUTINE: NORMAL

## 2019-10-01 PROCEDURE — 93010 ELECTROCARDIOGRAM REPORT: CPT | Performed by: INTERNAL MEDICINE

## 2023-04-14 ENCOUNTER — HOSPITAL ENCOUNTER (EMERGENCY)
Age: 63
Discharge: HOME | End: 2023-04-15
Attending: EMERGENCY MEDICINE
Payer: COMMERCIAL
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
## 2023-04-14 DIAGNOSIS — S52.501A CLOSED FRACTURE OF DISTAL END OF RIGHT RADIUS, UNSPECIFIED FRACTURE MORPHOLOGY, INITIAL ENCOUNTER: ICD-10-CM

## 2023-04-14 PROCEDURE — 99285 EMERGENCY DEPT VISIT HI MDM: CPT

## 2023-04-14 ASSESSMENT — PAIN DESCRIPTION - ORIENTATION: ORIENTATION: RIGHT

## 2023-04-14 ASSESSMENT — PAIN DESCRIPTION - DESCRIPTORS: DESCRIPTORS: DISCOMFORT

## 2023-04-14 ASSESSMENT — PAIN SCALES - GENERAL: PAINLEVEL_OUTOF10: 10

## 2023-04-14 ASSESSMENT — LIFESTYLE VARIABLES: HOW OFTEN DO YOU HAVE A DRINK CONTAINING ALCOHOL: NEVER

## 2023-04-14 ASSESSMENT — PAIN DESCRIPTION - PAIN TYPE: TYPE: ACUTE PAIN

## 2023-04-14 ASSESSMENT — PAIN DESCRIPTION - LOCATION: LOCATION: WRIST

## 2023-04-15 ENCOUNTER — APPOINTMENT (OUTPATIENT)
Dept: GENERAL RADIOLOGY | Age: 63
End: 2023-04-15
Payer: COMMERCIAL

## 2023-04-15 VITALS
WEIGHT: 170 LBS | HEART RATE: 73 BPM | SYSTOLIC BLOOD PRESSURE: 147 MMHG | TEMPERATURE: 98 F | BODY MASS INDEX: 27.32 KG/M2 | HEIGHT: 66 IN | RESPIRATION RATE: 18 BRPM | DIASTOLIC BLOOD PRESSURE: 87 MMHG | OXYGEN SATURATION: 96 %

## 2023-04-15 PROCEDURE — 6360000002 HC RX W HCPCS: Performed by: EMERGENCY MEDICINE

## 2023-04-15 PROCEDURE — 96372 THER/PROPH/DIAG INJ SC/IM: CPT

## 2023-04-15 PROCEDURE — 73110 X-RAY EXAM OF WRIST: CPT

## 2023-04-15 RX ORDER — OXYCODONE HYDROCHLORIDE AND ACETAMINOPHEN 5; 325 MG/1; MG/1
1 TABLET ORAL EVERY 8 HOURS PRN
Qty: 9 TABLET | Refills: 0 | Status: SHIPPED | OUTPATIENT
Start: 2023-04-15 | End: 2023-04-18

## 2023-04-15 RX ORDER — DEXAMETHASONE SODIUM PHOSPHATE 10 MG/ML
10 INJECTION INTRAMUSCULAR; INTRAVENOUS ONCE
Status: DISCONTINUED | OUTPATIENT
Start: 2023-04-15 | End: 2023-04-15

## 2023-04-15 RX ORDER — GUAIFENESIN 200 MG/10ML
200 LIQUID ORAL ONCE
Status: DISCONTINUED | OUTPATIENT
Start: 2023-04-15 | End: 2023-04-15

## 2023-04-15 RX ORDER — KETOROLAC TROMETHAMINE 30 MG/ML
30 INJECTION, SOLUTION INTRAMUSCULAR; INTRAVENOUS ONCE
Status: COMPLETED | OUTPATIENT
Start: 2023-04-15 | End: 2023-04-15

## 2023-04-15 RX ORDER — MORPHINE SULFATE 4 MG/ML
4 INJECTION, SOLUTION INTRAMUSCULAR; INTRAVENOUS ONCE
Status: DISCONTINUED | OUTPATIENT
Start: 2023-04-15 | End: 2023-04-15 | Stop reason: HOSPADM

## 2023-04-15 RX ADMIN — KETOROLAC TROMETHAMINE 30 MG: 30 INJECTION, SOLUTION INTRAMUSCULAR at 00:26

## 2023-04-15 ASSESSMENT — PAIN DESCRIPTION - LOCATION: LOCATION: WRIST

## 2023-04-15 ASSESSMENT — PAIN - FUNCTIONAL ASSESSMENT: PAIN_FUNCTIONAL_ASSESSMENT: NONE - DENIES PAIN

## 2023-04-15 ASSESSMENT — PAIN DESCRIPTION - ORIENTATION: ORIENTATION: RIGHT

## 2023-04-15 ASSESSMENT — ENCOUNTER SYMPTOMS
VOMITING: 0
ABDOMINAL PAIN: 0

## 2023-04-15 ASSESSMENT — PAIN SCALES - GENERAL: PAINLEVEL_OUTOF10: 10

## 2023-04-15 NOTE — ED PROVIDER NOTES
[address] [facility] ED  EMERGENCY DEPARTMENT ENCOUNTER      Pt Name: Tiffanie Dumont  MRN: [id number]  Jimmiegfolga lidia 1960  Date of evaluation: 4/14/2023  Provider: Basilio Costa [id number]       Chief Complaint   Patient presents with    Fall         HISTORY OF PRESENT ILLNESS   (Location/Symptom, Timing/Onset, Context/Setting, Quality, Duration, Modifying Factors, Severity)  Note limiting factors. Tiffanie Dumont is a 58 y.o. female who presents to the emergency department for evaluation of fall. Per chart review history of kidney stone, hyperlipidemia, hysterectomy. She is currently reporting right wrist pain. She said she tripped over the dog bed and fell forward catching her hand and knees on the ground. She says she scraped her head on the wall but did not significantly hit it, have LOC or amnesia. No headache or vision change, neck pain, chest pain, abdominal pain, back pain, numbness. Has not taken anything for relief prior to arrival.    HPI    Nursing Notes were reviewed. REVIEW OF SYSTEMS    (2-9 systems for level 4, 10 or more for level 5)     Review of Systems   Constitutional:         Fall, right wrist pain, right shin abrasion   Gastrointestinal:  Negative for abdominal pain and vomiting. Musculoskeletal:  Negative for neck pain. Neurological:  Negative for numbness. All other systems reviewed and are negative. Except as noted above the remainder of the review of systems was reviewed and negative.        PAST MEDICAL HISTORY     Past Medical History:   Diagnosis Date    H/O Kidney stone 11/10/2016    Hyperlipidemia     Kidney stone 11/10/2016    Post-menopause 11/10/2016         SURGICAL HISTORY       Past Surgical History:   Procedure Laterality Date    HYSTERECTOMY (CERVIX STATUS UNKNOWN)  2006    JONG Morse         CURRENT MEDICATIONS       Previous Medications    IBUPROFEN (ADVIL;MOTRIN) 200 MG TABLET    Take 200 mg by mouth 2 times daily as needed for Pain

## 2023-04-15 NOTE — ED NOTES
Patient states she hit her head when she fell but she does not want any tests of her head. Patient states \"Can I refuse any tests for my head\". Writer notified Dr. Baron Keller patient does not want any tests done for her head.       John Najera RN  04/15/23 0004

## 2023-04-15 NOTE — ED NOTES
Patient alert and oriented at this time. Patient ambulatory at this time. Patient skin is pink, warm, and dry. Patient respirations are even and unlabored at this time. Discharge instructions reviewed with patient. Patient verbalized understanding and states no questions at this time.       Indio Smith RN  04/15/23 [id number]

## 2023-04-15 NOTE — ED NOTES
Wrist splint applied to patients right wrist as ordered. Patient states no numbness, no tingling at this time. Patient cap refill less than 3 seconds at this time. Patient skin in fingers is pink, warm, and dry. Patient educated on care of splint, to not get split wet. Patient educated signs of compartment syndrome and to return to ED for worsening of current symptoms.      Vinod Teran RN  04/15/23 [id number]

## 2023-04-15 NOTE — ED TRIAGE NOTES
PT COMES TO THE ED TODAY WITH COMPLAINS OF WRIST PAIN FROM A FALL   PT STATES SHE TRIPPED OVER DOG BED TODAY SHE FELL, SHE FELL FORWARD ON TO HER WRIST AND KNEES BUT SHE DID HIT HER HEAD ON THE WALL ON THE WAY DOWN PT HAD NO LOC AND IS COMPLAINING ONLY OF PAIN IN HER RIGHT WRIST     PT HAS NO OPEN WOUNDS OR BLEEDING     PT IS STABLE IN TRIAGE   SKIN WARM PINK AND DRY   BREATHING IS EQUAL AND UNLABORED

## 2023-12-04 ENCOUNTER — OFFICE VISIT (OUTPATIENT)
Dept: ORTHOPEDIC SURGERY | Facility: CLINIC | Age: 63
End: 2023-12-04
Payer: COMMERCIAL

## 2023-12-04 VITALS — HEIGHT: 66 IN | WEIGHT: 175 LBS | BODY MASS INDEX: 28.12 KG/M2

## 2023-12-04 DIAGNOSIS — M75.01 ADHESIVE CAPSULITIS OF RIGHT SHOULDER: Primary | ICD-10-CM

## 2023-12-04 DIAGNOSIS — M19.039 WRIST ARTHRITIS: ICD-10-CM

## 2023-12-04 PROCEDURE — 20605 DRAIN/INJ JOINT/BURSA W/O US: CPT | Performed by: STUDENT IN AN ORGANIZED HEALTH CARE EDUCATION/TRAINING PROGRAM

## 2023-12-04 PROCEDURE — 2500000004 HC RX 250 GENERAL PHARMACY W/ HCPCS (ALT 636 FOR OP/ED): Performed by: STUDENT IN AN ORGANIZED HEALTH CARE EDUCATION/TRAINING PROGRAM

## 2023-12-04 PROCEDURE — 99214 OFFICE O/P EST MOD 30 MIN: CPT | Performed by: STUDENT IN AN ORGANIZED HEALTH CARE EDUCATION/TRAINING PROGRAM

## 2023-12-04 PROCEDURE — 20610 DRAIN/INJ JOINT/BURSA W/O US: CPT | Performed by: STUDENT IN AN ORGANIZED HEALTH CARE EDUCATION/TRAINING PROGRAM

## 2023-12-04 PROCEDURE — 1036F TOBACCO NON-USER: CPT | Performed by: STUDENT IN AN ORGANIZED HEALTH CARE EDUCATION/TRAINING PROGRAM

## 2023-12-04 PROCEDURE — 2500000005 HC RX 250 GENERAL PHARMACY W/O HCPCS: Performed by: STUDENT IN AN ORGANIZED HEALTH CARE EDUCATION/TRAINING PROGRAM

## 2023-12-04 RX ORDER — BETAMETHASONE SODIUM PHOSPHATE AND BETAMETHASONE ACETATE 3; 3 MG/ML; MG/ML
6 INJECTION, SUSPENSION INTRA-ARTICULAR; INTRALESIONAL; INTRAMUSCULAR; SOFT TISSUE
Status: COMPLETED | OUTPATIENT
Start: 2023-12-04 | End: 2023-12-04

## 2023-12-04 RX ORDER — TRIAMCINOLONE ACETONIDE 40 MG/ML
40 INJECTION, SUSPENSION INTRA-ARTICULAR; INTRAMUSCULAR
Status: COMPLETED | OUTPATIENT
Start: 2023-12-04 | End: 2023-12-04

## 2023-12-04 RX ORDER — LIDOCAINE HYDROCHLORIDE 10 MG/ML
4 INJECTION INFILTRATION; PERINEURAL
Status: COMPLETED | OUTPATIENT
Start: 2023-12-04 | End: 2023-12-04

## 2023-12-04 RX ORDER — LIDOCAINE HYDROCHLORIDE 10 MG/ML
1 INJECTION INFILTRATION; PERINEURAL
Status: COMPLETED | OUTPATIENT
Start: 2023-12-04 | End: 2023-12-04

## 2023-12-04 RX ADMIN — LIDOCAINE HYDROCHLORIDE 4 ML: 10 INJECTION, SOLUTION INFILTRATION; PERINEURAL at 12:16

## 2023-12-04 RX ADMIN — BETAMETHASONE ACETATE AND BETAMETHASONE SODIUM PHOSPHATE 6 MG: 3; 3 INJECTION, SUSPENSION INTRA-ARTICULAR; INTRALESIONAL; INTRAMUSCULAR; SOFT TISSUE at 12:18

## 2023-12-04 RX ADMIN — LIDOCAINE HYDROCHLORIDE 1 ML: 10 INJECTION, SOLUTION INFILTRATION; PERINEURAL at 12:18

## 2023-12-04 RX ADMIN — TRIAMCINOLONE ACETONIDE 40 MG: 40 INJECTION, SUSPENSION INTRA-ARTICULAR; INTRAMUSCULAR at 12:16

## 2023-12-04 ASSESSMENT — PAIN SCALES - GENERAL: PAINLEVEL_OUTOF10: 6

## 2023-12-04 ASSESSMENT — PATIENT HEALTH QUESTIONNAIRE - PHQ9
1. LITTLE INTEREST OR PLEASURE IN DOING THINGS: NOT AT ALL
2. FEELING DOWN, DEPRESSED OR HOPELESS: NOT AT ALL
SUM OF ALL RESPONSES TO PHQ9 QUESTIONS 1 AND 2: 0

## 2023-12-04 ASSESSMENT — PAIN - FUNCTIONAL ASSESSMENT: PAIN_FUNCTIONAL_ASSESSMENT: 0-10

## 2023-12-04 NOTE — PROGRESS NOTES
Chief Complaint   Patient presents with    Right Shoulder - Follow-up     1. Right shoulder OA   DOI- Wear and tear   S/P- Cortisone inj 8/21/23    Right Wrist - Follow-up     1. Right wrist distal radius Fx  DOI- 4/14/23 (7 months, 20 days out)        HPI  Patient presents today for follow up of right shoulder adhesive capsulitis, right distal radius fracture treated conservatively 8 months prior.  Regarding the shoulder feels like her pain discomfort has returned hoping to get a cortisone injection today.  Regarding the right wrist she feels like she has not made much progress is hoping to get some advice for this.  States that she has pain discomfort particular at her wrist as well as her base of her thumb bothers her quite a bit.  Tired of feeling this way 6 out of 10 pain today..       Physical exam  General: Alert and oriented to place, person, and time.  No acute distress and breathing comfortably; pleasant and cooperative with the examination.  Extremity:  Right shoulder:     Skin healthy to gross inspection  No ecchymosis, no swelling, no gross atrophy     No tenderness to palpation over acromioclavicular joint:    No tenderness to palpation over biceps tendon  No tenderness to palpation over the cervical spine      ROM:  Forward flexion: 0-160  External rotation: 45  Internal rotation: Low lumbar spine  Strength:  5/5 Resisted elevation  5/5 Resisted external rotation  Negative Spurling´s test  Neurovascular exam normal distally      Diagnostics:  No results found.     Procedures  L Inj/Asp: R glenohumeral on 12/4/2023 12:16 PM  Indications: pain  Details: 22 G needle, posterior approach  Medications: 40 mg triamcinolone acetonide 40 mg/mL; 4 mL lidocaine 10 mg/mL (1 %)  Consent was given by the patient. Immediately prior to procedure a time out was called to verify the correct patient, procedure, equipment, support staff and site/side marked as required. Patient was prepped and draped in the usual  sterile fashion.       M Inj/Asp: R radiocarpal on 12/4/2023 12:18 PM  Indications: pain  Details: 25 G needle, volar approach  Medications: 6 mg betamethasone acet,sod phos 6 mg/mL; 1 mL lidocaine 10 mg/mL (1 %)           Assessment:  63-year-old female with right shoulder adhesive capsulitis, right wrist posttraumatic arthritis/CMC arthritis    Treatment plan:  X-ray findings discussed with patient in detail  Will attempt injection therapy to include a intra-articular injection of the right shoulder as well as a intra-articular injection into the right wrist.  Will see how she does with this over the next 6 weeks time  If she fails to improve may benefit from an MRI of either the shoulder or wrist about which ones bothering her more at that point time  I do think a second opinion with Dr. Kari Prater MD would be helpful as well however we will reevaluate this at next visit  All of the patient's questions concerns answered

## 2024-01-15 ENCOUNTER — APPOINTMENT (OUTPATIENT)
Dept: ORTHOPEDIC SURGERY | Facility: CLINIC | Age: 64
End: 2024-01-15
Payer: COMMERCIAL

## 2024-04-02 ENCOUNTER — OFFICE VISIT (OUTPATIENT)
Dept: PRIMARY CARE | Facility: CLINIC | Age: 64
End: 2024-04-02
Payer: COMMERCIAL

## 2024-04-02 VITALS
TEMPERATURE: 97 F | RESPIRATION RATE: 18 BRPM | HEART RATE: 94 BPM | DIASTOLIC BLOOD PRESSURE: 70 MMHG | SYSTOLIC BLOOD PRESSURE: 104 MMHG | HEIGHT: 66 IN | OXYGEN SATURATION: 98 % | WEIGHT: 183 LBS | BODY MASS INDEX: 29.41 KG/M2

## 2024-04-02 DIAGNOSIS — R09.81 SINUS CONGESTION: ICD-10-CM

## 2024-04-02 DIAGNOSIS — J30.2 SEASONAL ALLERGIES: Primary | ICD-10-CM

## 2024-04-02 PROBLEM — M85.80 OSTEOPENIA: Status: ACTIVE | Noted: 2024-04-02

## 2024-04-02 PROBLEM — G43.819 MIGRAINE VARIANT, INTRACTABLE: Status: ACTIVE | Noted: 2019-09-29

## 2024-04-02 PROBLEM — M75.00 ADHESIVE CAPSULITIS OF SHOULDER: Status: ACTIVE | Noted: 2024-04-02

## 2024-04-02 PROBLEM — J30.9 ALLERGIC RHINITIS: Status: ACTIVE | Noted: 2024-04-02

## 2024-04-02 PROBLEM — K52.9 CHRONIC DIARRHEA: Status: ACTIVE | Noted: 2024-04-02

## 2024-04-02 PROBLEM — N39.46 URGE AND STRESS INCONTINENCE: Status: ACTIVE | Noted: 2024-04-02

## 2024-04-02 PROBLEM — D18.03 HEPATIC HEMANGIOMA: Status: ACTIVE | Noted: 2024-04-02

## 2024-04-02 PROBLEM — K64.9 HEMORRHOIDS: Status: ACTIVE | Noted: 2024-04-02

## 2024-04-02 PROBLEM — K62.5 RECTAL BLEEDING: Status: ACTIVE | Noted: 2024-04-02

## 2024-04-02 PROBLEM — M25.60 JOINT STIFFNESS: Status: ACTIVE | Noted: 2024-04-02

## 2024-04-02 PROBLEM — M70.62 TROCHANTERIC BURSITIS OF BOTH HIPS: Status: ACTIVE | Noted: 2024-04-02

## 2024-04-02 PROBLEM — Z20.822 SUSPECTED COVID-19 VIRUS INFECTION: Status: ACTIVE | Noted: 2024-04-02

## 2024-04-02 PROBLEM — S52.509A DISTAL RADIUS FRACTURE: Status: ACTIVE | Noted: 2024-04-02

## 2024-04-02 PROBLEM — R20.2 NUMBNESS AND TINGLING: Status: ACTIVE | Noted: 2024-04-02

## 2024-04-02 PROBLEM — R74.8 ABNORMAL LIVER ENZYMES: Status: ACTIVE | Noted: 2024-04-02

## 2024-04-02 PROBLEM — R93.89 ABNORMAL CAT SCAN: Status: ACTIVE | Noted: 2024-04-02

## 2024-04-02 PROBLEM — R10.9 ABDOMINAL PAIN: Status: ACTIVE | Noted: 2024-04-02

## 2024-04-02 PROBLEM — R15.2 FECAL URGENCY: Status: ACTIVE | Noted: 2024-04-02

## 2024-04-02 PROBLEM — M54.9 BACK PAIN: Status: ACTIVE | Noted: 2024-04-02

## 2024-04-02 PROBLEM — M25.539 WRIST PAIN: Status: ACTIVE | Noted: 2024-04-02

## 2024-04-02 PROBLEM — M25.559 CHRONIC HIP PAIN: Status: ACTIVE | Noted: 2024-04-02

## 2024-04-02 PROBLEM — R15.9 STOOL INCONTINENCE: Status: ACTIVE | Noted: 2024-04-02

## 2024-04-02 PROBLEM — R12 HEARTBURN: Status: ACTIVE | Noted: 2024-04-02

## 2024-04-02 PROBLEM — R20.0 NUMBNESS AND TINGLING: Status: ACTIVE | Noted: 2024-04-02

## 2024-04-02 PROBLEM — N39.0 UTI (URINARY TRACT INFECTION): Status: ACTIVE | Noted: 2024-04-02

## 2024-04-02 PROBLEM — M25.511 RIGHT SHOULDER PAIN: Status: ACTIVE | Noted: 2024-04-02

## 2024-04-02 PROBLEM — E78.5 HYPERLIPIDEMIA: Status: ACTIVE | Noted: 2024-04-02

## 2024-04-02 PROBLEM — G89.29 CHRONIC HIP PAIN: Status: ACTIVE | Noted: 2024-04-02

## 2024-04-02 PROBLEM — R53.83 FATIGUE: Status: ACTIVE | Noted: 2024-04-02

## 2024-04-02 PROBLEM — R82.90 ABNORMAL URINE: Status: ACTIVE | Noted: 2024-04-02

## 2024-04-02 PROBLEM — J20.9 BRONCHITIS, ACUTE: Status: ACTIVE | Noted: 2024-04-02

## 2024-04-02 PROBLEM — M48.061 LUMBAR STENOSIS: Status: ACTIVE | Noted: 2024-04-02

## 2024-04-02 PROBLEM — N28.89 MASS OF KIDNEY: Status: ACTIVE | Noted: 2024-04-02

## 2024-04-02 PROBLEM — R35.0 URINARY FREQUENCY: Status: ACTIVE | Noted: 2024-04-02

## 2024-04-02 PROBLEM — M70.61 TROCHANTERIC BURSITIS OF BOTH HIPS: Status: ACTIVE | Noted: 2024-04-02

## 2024-04-02 PROCEDURE — 99213 OFFICE O/P EST LOW 20 MIN: CPT | Performed by: FAMILY MEDICINE

## 2024-04-02 PROCEDURE — 1159F MED LIST DOCD IN RCRD: CPT | Performed by: FAMILY MEDICINE

## 2024-04-02 PROCEDURE — 1160F RVW MEDS BY RX/DR IN RCRD: CPT | Performed by: FAMILY MEDICINE

## 2024-04-02 PROCEDURE — 1036F TOBACCO NON-USER: CPT | Performed by: FAMILY MEDICINE

## 2024-04-02 RX ORDER — DOXYCYCLINE 100 MG/1
100 CAPSULE ORAL 2 TIMES DAILY
Qty: 14 CAPSULE | Refills: 1 | Status: SHIPPED | OUTPATIENT
Start: 2024-04-02 | End: 2024-04-09

## 2024-04-02 ASSESSMENT — ENCOUNTER SYMPTOMS
SINUS PRESSURE: 1
ABDOMINAL PAIN: 0
FEVER: 0
WHEEZING: 0
SINUS PAIN: 1
SHORTNESS OF BREATH: 0
COUGH: 1
BLOOD IN STOOL: 0
DYSURIA: 0
SORE THROAT: 1
CHILLS: 1
FATIGUE: 1

## 2024-04-02 NOTE — PROGRESS NOTES
Subjective   Patient ID: Shelley Jones is a 64 y.o. female who presents for sinus issues (Declined swabs/).  HPI  Eyes  are draining   and ears  bothering    Off and     on for  two weeks  She is not sneezing  Sometimes  itchy  eyes  Throat clearing   Review of Systems   Constitutional:  Positive for chills and fatigue. Negative for fever.   HENT:  Positive for congestion, ear discharge, ear pain, sinus pressure, sinus pain, sneezing and sore throat.    Respiratory:  Positive for cough. Negative for shortness of breath and wheezing.    Cardiovascular:  Negative for chest pain and leg swelling.   Gastrointestinal:  Negative for abdominal pain and blood in stool.   Genitourinary:  Negative for dysuria.       Objective   Physical Exam  Vitals: I have reviewed the vitals  General: Well-developed.  In no acute distress.  Eyes:   sclera nonicteric.  Conjunctiva not injected.  No discharge.   HEAD: Normocephalic, atraumatic.  HEENT   Mucous membranes moist.  Posterior oropharynx nonerythematous, no tonsillar exudates.      Nose with yellow  coryza   No cervical lymphadenopathy.  Cardio: Regular rate and rhythm.  No murmur, rub or gallop.  Pulmonary: Lungs clear to auscultation in all fields.  No accessory muscle use.  GI/: Normal active bowel sounds.  Soft, nontender.  No masses or organomegaly appreciated.  Musculoskeletal: No gross deformities appreciated.  Neuro: Alert, age-appropriate.  Normal muscle tone.  Moving all extremities.  Skin: No rash, bruises or lesions.   Labs        Assessment/Plan   Problem List Items Addressed This Visit       Seasonal allergies - Primary    Sinus congestion     Rest and drink plenty of water/fluid  Please use a humidifier or   use warm  mist  in a   hot shower ...repeat   3-4 times a day for approximatelly .... 10 minutes at a time..... this lessens congestion....  Use salt water sprays..... as directed... saline sprays  Apply a warm moist wash...cloth to  your face  3-4 times a  day  Avoid tobacco smoke and other environmental irritants          Relevant Medications    doxycycline (Vibramycin) 100 mg capsule    dextromethorphan-guaifenesin (Mucinex DM)  mg 12 hr tablet

## 2024-04-02 NOTE — ASSESSMENT & PLAN NOTE
Rest and drink plenty of water/fluid  Please use a humidifier or   use warm  mist  in a   hot shower ...repeat   3-4 times a day for approximatelly .... 10 minutes at a time..... this lessens congestion....  Use salt water sprays..... as directed... saline sprays  Apply a warm moist wash...cloth to  your face  3-4 times a day  Avoid tobacco smoke and other environmental irritants

## 2024-04-02 NOTE — PATIENT INSTRUCTIONS
Please consider exercise program involving walking or some other form of aerobic activity 5 days weekly for 30 minutes... Let's also consider strengthening of large muscle groups like the abdominal muscles or shoulder muscles... Twice weekly with reps of 5/10/15 exercises and gradually increase strength.. This is not heavy strength training but light weight training... Sit ups or back exercise routine.. Please ask for handout if uncertain how to do..This  will help to strengthen your muscles which in turn will help you to lose weight.... You might ask what is the best diet available.. I would strongly encourage you to consider  Weight Watchers.. And as  your  fellow on  Weight Watchers physician attempting to  live this  LIFE  style  choice with you....  I will be glad to give you recommendations on what to eat.. Consider buying Jenelle bread.., carlitos bagle thin bread.. oikos yogurt... eggs  to eat as hard-boiled... Halo top ice cream for snack... All these are delicious foods which.. when eaten and  being compliant eating three  meals daily  breakfast lunch and dinner, drinking  64 ounces of water daily we will all win together !!!!!!!. This will be a means for you to lose weight... Consider also the smart phone kevin ... My plate.. Or My  fitness  pal..,  as additional possibilities for weight loss... Good  lucjong Prater!    Discussed medication side effects.  The  risk benefits and treatment options  discussed with patient.       Please schedule follow-up appointment based upon your improvement/failure to improve/chronic medical conditions and physician recommendations during office appointment at the .       Patient advised to go to er if symptoms worsen or to call answering service, or to return to office for additional evaluation    This note was partially  generated using Dragon voice recognition and there may be incorrect words, wording, spelling, or pronunciation errors that were not  corrected prior to committing the note to the medical record.     Problem List Items Addressed This Visit       Seasonal allergies - Primary    Sinus congestion     Rest and drink plenty of water/fluid  Please use a humidifier or   use warm  mist  in a   hot shower ...repeat   3-4 times a day for approximatelly .... 10 minutes at a time..... this lessens congestion....  Use salt water sprays..... as directed... saline sprays  Apply a warm moist wash...cloth to  your face  3-4 times a day  Avoid tobacco smoke and other environmental irritants          Relevant Medications    doxycycline (Vibramycin) 100 mg capsule    dextromethorphan-guaifenesin (Mucinex DM)  mg 12 hr tablet   Manage cravings  Cravings for tobacco happen more often and it     may  take  days and weeks to  quit. The longer you go without smoking, the fewer   urges you  will have. fighting cravings can be easier if you have a plan  Know you're triggers  Make a list of things to try when the urge to smoke hits. Find what works best for you.  Keep your hands busy ...  plan an alternative  instead of picking up a cigarette. Keep hard candies, fruits and vegetables with you.    Drink more water . this helps 2+ to    flush nicotine out of your system faster. It also also reduces   that in the moment craving.  .  Keep moving. Try going for a walk or jog. Go up and down the stairs a few times. Take  dog for a walk. Physical activity even in  short bursts can help with her energy and nicotine craving.  Take slow deep breaths, refill your craving. Slowly inhale through your nose and exhale thruh your mouth. Repeat this 10 times or 2 you're feeling more relaxed.  Stay motivated.  Quitting as a process. Take it one day at a time. The first hours, days and weeks without she was can be hard. Keep a positive outlook can help you get through.  .   IT can take a person an average of 7 times before successfully quitting. Remind herself of all reason to have  STARTED . Stay away from situations STIMULATING YOU  to make you want to smoke.  Reward YOURSELF  along the way celebrates your success using money saved on expensive tobacco products to buy something special. Enjoying outdoor height now that you can breathe easier. Enjoying the fact that YOU NO longer smoke tobacco. Notice that food taste better when you're not smoking or chewing tobacco.  Prevent weight gain  Not everyone will gain weight when they quit smoking. The average waking can be less than 10 pounds. To help prevent weight gain:  8 plenty of fruits and vegetables.    Adequate diet. Plan water it will help keep you fall and persistent. Get MORE  sleep. Exercise go for walks at nighttime and daytime WHEN YOUu would otherwise have cigarette

## 2024-04-12 DIAGNOSIS — Z12.31 ENCOUNTER FOR SCREENING MAMMOGRAM FOR BREAST CANCER: ICD-10-CM

## 2024-06-10 ENCOUNTER — OFFICE VISIT (OUTPATIENT)
Dept: PRIMARY CARE | Facility: CLINIC | Age: 64
End: 2024-06-10
Payer: COMMERCIAL

## 2024-06-10 VITALS
HEIGHT: 66 IN | HEART RATE: 89 BPM | OXYGEN SATURATION: 96 % | SYSTOLIC BLOOD PRESSURE: 132 MMHG | DIASTOLIC BLOOD PRESSURE: 84 MMHG | TEMPERATURE: 97 F | BODY MASS INDEX: 29.09 KG/M2 | RESPIRATION RATE: 18 BRPM | WEIGHT: 181 LBS

## 2024-06-10 DIAGNOSIS — E78.5 HYPERLIPIDEMIA, UNSPECIFIED HYPERLIPIDEMIA TYPE: ICD-10-CM

## 2024-06-10 DIAGNOSIS — R74.8 ABNORMAL LIVER ENZYMES: ICD-10-CM

## 2024-06-10 DIAGNOSIS — Z00.00 HEALTHCARE MAINTENANCE: ICD-10-CM

## 2024-06-10 DIAGNOSIS — F33.40 RECURRENT MAJOR DEPRESSIVE DISORDER, IN REMISSION (CMS-HCC): ICD-10-CM

## 2024-06-10 DIAGNOSIS — F32.A DEPRESSION, UNSPECIFIED DEPRESSION TYPE: ICD-10-CM

## 2024-06-10 DIAGNOSIS — Z12.11 SCREENING FOR COLON CANCER: ICD-10-CM

## 2024-06-10 DIAGNOSIS — Z78.0 ENCOUNTER FOR OSTEOPOROSIS SCREENING IN ASYMPTOMATIC POSTMENOPAUSAL PATIENT: ICD-10-CM

## 2024-06-10 DIAGNOSIS — Z87.891 PERSONAL HISTORY OF NICOTINE DEPENDENCE: ICD-10-CM

## 2024-06-10 DIAGNOSIS — K62.5 RECTAL BLEEDING: ICD-10-CM

## 2024-06-10 DIAGNOSIS — Z12.31 BREAST CANCER SCREENING BY MAMMOGRAM: Primary | ICD-10-CM

## 2024-06-10 DIAGNOSIS — R53.83 OTHER FATIGUE: ICD-10-CM

## 2024-06-10 DIAGNOSIS — R31.9 HEMATURIA, UNSPECIFIED TYPE: ICD-10-CM

## 2024-06-10 DIAGNOSIS — N28.89 MASS OF KIDNEY: ICD-10-CM

## 2024-06-10 DIAGNOSIS — Z13.820 ENCOUNTER FOR OSTEOPOROSIS SCREENING IN ASYMPTOMATIC POSTMENOPAUSAL PATIENT: ICD-10-CM

## 2024-06-10 PROCEDURE — 1036F TOBACCO NON-USER: CPT | Performed by: FAMILY MEDICINE

## 2024-06-10 PROCEDURE — 99397 PER PM REEVAL EST PAT 65+ YR: CPT | Performed by: FAMILY MEDICINE

## 2024-06-10 PROCEDURE — 1159F MED LIST DOCD IN RCRD: CPT | Performed by: FAMILY MEDICINE

## 2024-06-10 RX ORDER — ESCITALOPRAM OXALATE 5 MG/1
5 TABLET ORAL DAILY
Qty: 30 TABLET | Refills: 11 | Status: SHIPPED | OUTPATIENT
Start: 2024-06-10 | End: 2024-07-10

## 2024-06-10 ASSESSMENT — ANXIETY QUESTIONNAIRES
4. TROUBLE RELAXING: NOT AT ALL
5. BEING SO RESTLESS THAT IT IS HARD TO SIT STILL: NOT AT ALL
GAD7 TOTAL SCORE: 4
6. BECOMING EASILY ANNOYED OR IRRITABLE: NEARLY EVERY DAY
3. WORRYING TOO MUCH ABOUT DIFFERENT THINGS: NOT AT ALL
1. FEELING NERVOUS, ANXIOUS, OR ON EDGE: SEVERAL DAYS
7. FEELING AFRAID AS IF SOMETHING AWFUL MIGHT HAPPEN: NOT AT ALL
2. NOT BEING ABLE TO STOP OR CONTROL WORRYING: NOT AT ALL

## 2024-06-10 ASSESSMENT — ENCOUNTER SYMPTOMS
WHEEZING: 0
HEMATURIA: 0
CHOKING: 0
NAUSEA: 1
NERVOUS/ANXIOUS: 1
SHORTNESS OF BREATH: 0
TROUBLE SWALLOWING: 0
BLOOD IN STOOL: 0
CHEST TIGHTNESS: 0
PALPITATIONS: 0
POLYPHAGIA: 0
UNEXPECTED WEIGHT CHANGE: 1
VOMITING: 0
DYSURIA: 0
FREQUENCY: 0
SORE THROAT: 0
NECK STIFFNESS: 1
EYE ITCHING: 0
DIARRHEA: 0
DIZZINESS: 0
BRUISES/BLEEDS EASILY: 0
MYALGIAS: 0
FLANK PAIN: 0
BACK PAIN: 1
ARTHRALGIAS: 1
POLYDIPSIA: 0
RECTAL PAIN: 0
SINUS PAIN: 0
APNEA: 0
CONSTIPATION: 0
COUGH: 0
APPETITE CHANGE: 0
CONFUSION: 0
RHINORRHEA: 0
DECREASED CONCENTRATION: 0
FATIGUE: 1
AGITATION: 1
ABDOMINAL PAIN: 0
NECK PAIN: 1

## 2024-06-10 ASSESSMENT — PATIENT HEALTH QUESTIONNAIRE - PHQ9
SUM OF ALL RESPONSES TO PHQ9 QUESTIONS 1 AND 2: 6
5. POOR APPETITE OR OVEREATING: NEARLY EVERY DAY
2. FEELING DOWN, DEPRESSED OR HOPELESS: NEARLY EVERY DAY
3. TROUBLE FALLING OR STAYING ASLEEP OR SLEEPING TOO MUCH: MORE THAN HALF THE DAYS
6. FEELING BAD ABOUT YOURSELF - OR THAT YOU ARE A FAILURE OR HAVE LET YOURSELF OR YOUR FAMILY DOWN: SEVERAL DAYS
9. THOUGHTS THAT YOU WOULD BE BETTER OFF DEAD, OR OF HURTING YOURSELF: NOT AT ALL
7. TROUBLE CONCENTRATING ON THINGS, SUCH AS READING THE NEWSPAPER OR WATCHING TELEVISION: MORE THAN HALF THE DAYS
4. FEELING TIRED OR HAVING LITTLE ENERGY: NEARLY EVERY DAY
1. LITTLE INTEREST OR PLEASURE IN DOING THINGS: NEARLY EVERY DAY
8. MOVING OR SPEAKING SO SLOWLY THAT OTHER PEOPLE COULD HAVE NOTICED. OR THE OPPOSITE, BEING SO FIGETY OR RESTLESS THAT YOU HAVE BEEN MOVING AROUND A LOT MORE THAN USUAL: NOT AT ALL
SUM OF ALL RESPONSES TO PHQ QUESTIONS 1-9: 17

## 2024-06-10 NOTE — PATIENT INSTRUCTIONS
Please consider the following medications to help    mitigate  Covid during this time  Vitamin C 1000 mg    daily  B complex daily        VITAMIN D 3  200O IU DAILY   B 12  2500 international units daily     Multivitamin with zinc  Extra rest  Stress reduction       Please consider exercise program involving walking or some other form of aerobic activity 5 days weekly for 30 minutes... Let's also consider strengthening of large muscle groups like the abdominal muscles or shoulder muscles... Twice weekly with reps of 5/10/15 exercises and gradually increase strength.. This is not heavy strength training but light weight training... Sit ups or back exercise routine.. Please ask for handout if uncertain how to do..This  will help to strengthen your muscles which in turn will help you to lose weight.... You might ask what is the best diet available.. I would strongly encourage you to consider  Weight Watchers.. And as  your  fellow on  Weight Watchers physician attempting to  live this  LIFE  style  choice with you....  I will be glad to give you recommendations on what to eat.. Consider buying Jenelle bread.., carlitos bagle thin bread.. oikos yogurt... eggs  to eat as hard-boiled... Halo top ice cream for snack... All these are delicious foods which.. when eaten and  being compliant eating three  meals daily  breakfast lunch and dinner, drinking  64 ounces of water daily we will all win together !!!!!!!. This will be a means for you to lose weight... Consider also the smart phone kevin ... My plate.. Or My  fitness  pal..,  as additional possibilities for weight loss... Good  luck  Dr. NAM Prater!    Discussed medication side effects.  The  risk benefits and treatment options  discussed with patient.       Please schedule follow-up appointment based upon your improvement/failure to improve/chronic medical conditions and physician recommendations during office appointment at the .       Patient advised to go to  er if symptoms worsen or to call answering service, or to return to office for additional evaluation    This note was partially  generated using Dragon voice recognition and there may be incorrect words, wording, spelling, or pronunciation errors that were not corrected prior to committing the note to the medical record.

## 2024-06-10 NOTE — ASSESSMENT & PLAN NOTE
Blood  pressure  is   stable Monitor your blood pressure at home and notify if blood pressure consistently over 140 systolic 90 diastolic   See dentist twice yearly  Get checked yearly  30 minutes of moderate intensity exercise 5 days weekly  Application of sunscreen every 2 hours during peak times  Regular sleep schedule  At least 8 hours nightly of sleep..  Limit TV/screen time 30 minutes prior to bedtime

## 2024-06-10 NOTE — PROGRESS NOTES
Subjective   Shelley Jones is a 64 y.o. female who is here for a routine exam.  Active Problem List      Comprehensive Medical/Surgical/Social/Family History  Past Medical History:   Diagnosis Date    Alcoholism in recovery (Multi)     Chronic obstructive pulmonary disease with (acute) exacerbation (Multi) 07/02/2019    COPD exacerbation    Chronic sinusitis, unspecified     Frequent sinus infections    Nicotine addiction     Personal history of other diseases of the digestive system     History of gallbladder disease    Personal history of other endocrine, nutritional and metabolic disease     History of high cholesterol    Personal history of other specified conditions 07/02/2019    History of diarrhea    Personal history of other specified conditions     History of night sweats    Personal history of other specified conditions     History of weakness     Past Surgical History:   Procedure Laterality Date    OTHER SURGICAL HISTORY  07/02/2019    Hysterectomy    OTHER SURGICAL HISTORY  07/02/2019    Tonsillectomy    OTHER SURGICAL HISTORY  09/17/2021    Renal lithotripsy    OTHER SURGICAL HISTORY  04/07/2021    Cholecystectomy     Social History     Social History Narrative    Not on file       History former smoker quit approximately 5 years ago with 45-pack-year history  Previous alcoholism in recovery and remission with last drink 33 years ago  Previously  and  x 3      Allergies and Medications  Adhesive, Sulfamethoxazole, and Sulfamethoxazole-trimethoprim  No current outpatient medications on file prior to visit.     No current facility-administered medications on file prior to visit.   Meds takes loratadine for allergies otherwise no meds    New Concerns:  Under a lot of stress  Before Easter  ..  Trouble    with work   / feeling  like  being attacked at work  Broke down  at   work   .. Started to cry ..  No issues see pH Q9 along with EH-7 work stress difficulty getting along with coworkers  because crying episode and now with depression see test above  Lifestyle  Diet:  Could be improved  Physical Activity: Not on file      Tobacco Use: Medium Risk (6/10/2024)    Patient History     Smoking Tobacco Use: Former     Smokeless Tobacco Use: Never     Passive Exposure: Not on file      Alcohol Use: Not At Risk (4/14/2023)    Received from [facility]    AUDIT-C     Frequency of Alcohol Consumption: Never     Average Number of Drinks: Not on file     Frequency of Binge Drinking: Not on file      Depression: At risk (6/10/2024)    PHQ-2     PHQ-2 Score: 6      Stress: Not on file       Consultants:        Colorectal Screening:   colonoscopy  Date: ???    Breast Screening: Due - Ordered today  History of abnormal mammogram: yes - deneis  Family history of breast cancer: no    Pap Smear: follows with OB/GYN hysterectomy at 44  for endometriosis  Abnormal uterine bleeding: deneis   Urinary incontinence: -    Dexa Scan: due today    Review of Systems   Constitutional:  Positive for fatigue and unexpected weight change. Negative for appetite change.   HENT:  Negative for congestion, ear pain, postnasal drip, rhinorrhea, sinus pain, sneezing, sore throat, tinnitus and trouble swallowing.    Eyes:  Negative for itching.   Respiratory:  Negative for apnea, cough, choking, chest tightness, shortness of breath and wheezing.    Cardiovascular:  Negative for chest pain and palpitations.   Gastrointestinal:  Positive for nausea. Negative for abdominal pain, blood in stool, constipation, diarrhea, rectal pain and vomiting.   Endocrine: Negative for cold intolerance, heat intolerance, polydipsia, polyphagia and polyuria.   Genitourinary:  Negative for dysuria, enuresis, flank pain, frequency, hematuria, pelvic pain and vaginal bleeding.   Musculoskeletal:  Positive for arthralgias, back pain, neck pain and neck stiffness. Negative for gait problem and myalgias.   Neurological:  Negative for dizziness.  "  Hematological:  Does not bruise/bleed easily.   Psychiatric/Behavioral:  Positive for agitation. Negative for behavioral problems, confusion, decreased concentration and suicidal ideas. The patient is nervous/anxious.        Objective   /84   Pulse 89   Temp 36.1 °C (97 °F)   Resp 18   Ht 1.676 m (5' 6\")   Wt 82.1 kg (181 lb)   SpO2 96%   BMI 29.21 kg/m²     Physical Exam  Vitals reviewed.   Constitutional:       Appearance: Normal appearance.   HENT:      Head: Normocephalic.      Right Ear: External ear normal.      Left Ear: External ear normal.      Nose: Nose normal.      Mouth/Throat:      Mouth: Mucous membranes are moist.   Eyes:      Conjunctiva/sclera: Conjunctivae normal.   Cardiovascular:      Rate and Rhythm: Regular rhythm.      Heart sounds: Normal heart sounds.   Pulmonary:      Effort: Pulmonary effort is normal.      Breath sounds: Normal breath sounds.   Abdominal:      General: Bowel sounds are normal.      Palpations: Abdomen is soft.   Musculoskeletal:         General: Normal range of motion.      Cervical back: Neck supple.   Skin:     General: Skin is warm and dry.   Neurological:      General: No focal deficit present.      Mental Status: She is alert and oriented to person, place, and time.   Psychiatric:         Behavior: Behavior normal.         Judgment: Judgment normal.         Assessment/Plan   Problem List Items Addressed This Visit       Rectal bleeding    Mass of kidney    Relevant Orders    US abdomen complete    Hyperlipidemia    Relevant Orders    Lipid Panel    Hematuria    Fatigue    Relevant Orders    CBC and Auto Differential    Comprehensive Metabolic Panel    Triiodothyronine, Free    Thyroxine, Free    Thyroid Stimulating Hormone    Microscopic Only, Urine    Urine Culture    Microscopic Only, Urine    Hepatitis C Antibody    HIV 1/2 Antigen/Antibody Screen with Reflex to Confirmation    Abnormal liver enzymes    Healthcare maintenance     Blood  pressure  " is   stable Monitor your blood pressure at home and notify if blood pressure consistently over 140 systolic 90 diastolic   See dentist twice yearly  Get checked yearly  30 minutes of moderate intensity exercise 5 days weekly  Application of sunscreen every 2 hours during peak times  Regular sleep schedule  At least 8 hours nightly of sleep..  Limit TV/screen time 30 minutes prior to bedtime          Recurrent major depressive disorder, in remission (CMS-HCC)     Lexapro    see  meds  and to  psychology           Other Visit Diagnoses       Breast cancer screening by mammogram    -  Primary    Relevant Orders    BI mammo bilateral screening tomosynthesis    Screening for colon cancer        Relevant Orders    Colonoscopy Screening; High Risk Patient    Personal history of nicotine dependence        Relevant Orders    CT lung screening low dose    Encounter for osteoporosis screening in asymptomatic postmenopausal patient        Relevant Orders    XR DEXA bone density    Depression, unspecified depression type        Relevant Medications    escitalopram (Lexapro) 5 mg tablet    Other Relevant Orders    Referral to Psychology            Reviewed Social Determinants of health with patient, discussed healthy lifestyle including 150 minutes of physical activity per week  Ordered/Reviewed baseline labwork -CBC, CMP, Lipid Panel  Immunizations:    advised  tetanus  Pap smear Up-to-Date, most recent s/p   hysterectomy  for  endometriosis   Mammogram ordered   Dexa ordered   Colorectal Screen ordered

## 2024-06-13 ENCOUNTER — LAB (OUTPATIENT)
Dept: LAB | Facility: LAB | Age: 64
End: 2024-06-13
Payer: COMMERCIAL

## 2024-06-13 DIAGNOSIS — E78.5 HYPERLIPIDEMIA, UNSPECIFIED HYPERLIPIDEMIA TYPE: ICD-10-CM

## 2024-06-13 DIAGNOSIS — R53.83 OTHER FATIGUE: ICD-10-CM

## 2024-06-13 LAB
ALBUMIN SERPL BCP-MCNC: 4.6 G/DL (ref 3.4–5)
ALP SERPL-CCNC: 124 U/L (ref 33–136)
ALT SERPL W P-5'-P-CCNC: 15 U/L (ref 7–45)
ANION GAP SERPL CALC-SCNC: 12 MMOL/L (ref 10–20)
AST SERPL W P-5'-P-CCNC: 16 U/L (ref 9–39)
BASOPHILS # BLD AUTO: 0.05 X10*3/UL (ref 0–0.1)
BASOPHILS NFR BLD AUTO: 0.9 %
BILIRUB SERPL-MCNC: 0.7 MG/DL (ref 0–1.2)
BUN SERPL-MCNC: 15 MG/DL (ref 6–23)
CALCIUM SERPL-MCNC: 9.4 MG/DL (ref 8.6–10.3)
CHLORIDE SERPL-SCNC: 103 MMOL/L (ref 98–107)
CHOLEST SERPL-MCNC: 304 MG/DL (ref 0–199)
CHOLESTEROL/HDL RATIO: 6
CO2 SERPL-SCNC: 30 MMOL/L (ref 21–32)
CREAT SERPL-MCNC: 0.84 MG/DL (ref 0.5–1.05)
EGFRCR SERPLBLD CKD-EPI 2021: 77 ML/MIN/1.73M*2
EOSINOPHIL # BLD AUTO: 0.08 X10*3/UL (ref 0–0.7)
EOSINOPHIL NFR BLD AUTO: 1.4 %
ERYTHROCYTE [DISTWIDTH] IN BLOOD BY AUTOMATED COUNT: 11.6 % (ref 11.5–14.5)
GLUCOSE SERPL-MCNC: 99 MG/DL (ref 74–99)
HCT VFR BLD AUTO: 43 % (ref 36–46)
HDLC SERPL-MCNC: 50.6 MG/DL
HGB BLD-MCNC: 14.2 G/DL (ref 12–16)
HYALINE CASTS #/AREA URNS AUTO: ABNORMAL /LPF
IMM GRANULOCYTES # BLD AUTO: 0.01 X10*3/UL (ref 0–0.7)
IMM GRANULOCYTES NFR BLD AUTO: 0.2 % (ref 0–0.9)
LDLC SERPL CALC-MCNC: 207 MG/DL
LYMPHOCYTES # BLD AUTO: 1.45 X10*3/UL (ref 1.2–4.8)
LYMPHOCYTES NFR BLD AUTO: 26.1 %
MCH RBC QN AUTO: 30.1 PG (ref 26–34)
MCHC RBC AUTO-ENTMCNC: 33 G/DL (ref 32–36)
MCV RBC AUTO: 91 FL (ref 80–100)
MONOCYTES # BLD AUTO: 0.32 X10*3/UL (ref 0.1–1)
MONOCYTES NFR BLD AUTO: 5.8 %
MUCOUS THREADS #/AREA URNS AUTO: ABNORMAL /LPF
NEUTROPHILS # BLD AUTO: 3.64 X10*3/UL (ref 1.2–7.7)
NEUTROPHILS NFR BLD AUTO: 65.6 %
NON HDL CHOLESTEROL: 253 MG/DL (ref 0–149)
NRBC BLD-RTO: 0 /100 WBCS (ref 0–0)
PLATELET # BLD AUTO: 249 X10*3/UL (ref 150–450)
POTASSIUM SERPL-SCNC: 4.3 MMOL/L (ref 3.5–5.3)
PROT SERPL-MCNC: 6.8 G/DL (ref 6.4–8.2)
RBC # BLD AUTO: 4.71 X10*6/UL (ref 4–5.2)
RBC #/AREA URNS AUTO: ABNORMAL /HPF
SODIUM SERPL-SCNC: 141 MMOL/L (ref 136–145)
SQUAMOUS #/AREA URNS AUTO: ABNORMAL /HPF
T4 FREE SERPL-MCNC: 0.95 NG/DL (ref 0.61–1.12)
TRIGL SERPL-MCNC: 231 MG/DL (ref 0–149)
TSH SERPL-ACNC: 0.1 MIU/L (ref 0.44–3.98)
VLDL: 46 MG/DL (ref 0–40)
WBC # BLD AUTO: 5.6 X10*3/UL (ref 4.4–11.3)
WBC #/AREA URNS AUTO: ABNORMAL /HPF

## 2024-06-13 PROCEDURE — 86803 HEPATITIS C AB TEST: CPT

## 2024-06-13 PROCEDURE — 87086 URINE CULTURE/COLONY COUNT: CPT

## 2024-06-13 PROCEDURE — 85025 COMPLETE CBC W/AUTO DIFF WBC: CPT

## 2024-06-13 PROCEDURE — 87389 HIV-1 AG W/HIV-1&-2 AB AG IA: CPT

## 2024-06-13 PROCEDURE — 80061 LIPID PANEL: CPT

## 2024-06-13 PROCEDURE — 80053 COMPREHEN METABOLIC PANEL: CPT

## 2024-06-13 PROCEDURE — 84443 ASSAY THYROID STIM HORMONE: CPT

## 2024-06-13 PROCEDURE — 36415 COLL VENOUS BLD VENIPUNCTURE: CPT

## 2024-06-13 PROCEDURE — 81001 URINALYSIS AUTO W/SCOPE: CPT

## 2024-06-13 PROCEDURE — 84439 ASSAY OF FREE THYROXINE: CPT

## 2024-06-13 PROCEDURE — 84481 FREE ASSAY (FT-3): CPT

## 2024-06-14 LAB
BACTERIA UR CULT: NORMAL
HCV AB SER QL: NONREACTIVE
HIV 1+2 AB+HIV1 P24 AG SERPL QL IA: NONREACTIVE
T3FREE SERPL-MCNC: 4.3 PG/ML (ref 2.3–4.2)

## 2024-06-15 ENCOUNTER — HOSPITAL ENCOUNTER (OUTPATIENT)
Dept: RADIOLOGY | Facility: CLINIC | Age: 64
Discharge: HOME | End: 2024-06-15
Payer: COMMERCIAL

## 2024-06-15 DIAGNOSIS — N28.89 MASS OF KIDNEY: ICD-10-CM

## 2024-06-15 PROCEDURE — 76700 US EXAM ABDOM COMPLETE: CPT

## 2024-06-20 ENCOUNTER — TELEPHONE (OUTPATIENT)
Dept: PRIMARY CARE | Facility: CLINIC | Age: 64
End: 2024-06-20
Payer: COMMERCIAL

## 2024-06-20 NOTE — TELEPHONE ENCOUNTER
Pt called 06/20/2024  Pt is wondering if we received any paperwork for pt's FMLA, I don't see anything in pt's chart and I can't find any paperwork anywhere  Please advise if you have this.  If not I did give pt our fax number.  Thank you!

## 2024-06-28 ENCOUNTER — HOSPITAL ENCOUNTER (OUTPATIENT)
Dept: RADIOLOGY | Facility: HOSPITAL | Age: 64
Discharge: HOME | End: 2024-06-28
Payer: COMMERCIAL

## 2024-06-28 DIAGNOSIS — Z87.891 PERSONAL HISTORY OF NICOTINE DEPENDENCE: ICD-10-CM

## 2024-06-28 PROCEDURE — 71271 CT THORAX LUNG CANCER SCR C-: CPT

## 2024-07-02 ENCOUNTER — HOSPITAL ENCOUNTER (OUTPATIENT)
Dept: RADIOLOGY | Facility: HOSPITAL | Age: 64
Discharge: HOME | End: 2024-07-02
Payer: COMMERCIAL

## 2024-07-02 VITALS — BODY MASS INDEX: 28.12 KG/M2 | WEIGHT: 175 LBS | HEIGHT: 66 IN

## 2024-07-02 DIAGNOSIS — Z78.0 ENCOUNTER FOR OSTEOPOROSIS SCREENING IN ASYMPTOMATIC POSTMENOPAUSAL PATIENT: ICD-10-CM

## 2024-07-02 DIAGNOSIS — Z13.820 ENCOUNTER FOR OSTEOPOROSIS SCREENING IN ASYMPTOMATIC POSTMENOPAUSAL PATIENT: ICD-10-CM

## 2024-07-02 DIAGNOSIS — Z12.31 BREAST CANCER SCREENING BY MAMMOGRAM: ICD-10-CM

## 2024-07-02 PROCEDURE — 77067 SCR MAMMO BI INCL CAD: CPT

## 2024-07-02 PROCEDURE — 77080 DXA BONE DENSITY AXIAL: CPT | Performed by: RADIOLOGY

## 2024-07-02 PROCEDURE — 77080 DXA BONE DENSITY AXIAL: CPT

## 2024-07-02 ASSESSMENT — LIFESTYLE VARIABLES
CURRENT_SMOKER: N
3_OR_MORE_DRINKS_PER_DAY: N

## 2024-07-08 ENCOUNTER — APPOINTMENT (OUTPATIENT)
Dept: PRIMARY CARE | Facility: CLINIC | Age: 64
End: 2024-07-08
Payer: COMMERCIAL

## 2024-07-08 VITALS
HEIGHT: 66 IN | WEIGHT: 178.4 LBS | BODY MASS INDEX: 28.67 KG/M2 | SYSTOLIC BLOOD PRESSURE: 117 MMHG | HEART RATE: 78 BPM | OXYGEN SATURATION: 96 % | TEMPERATURE: 97 F | DIASTOLIC BLOOD PRESSURE: 76 MMHG | RESPIRATION RATE: 18 BRPM

## 2024-07-08 DIAGNOSIS — E05.90 HYPERTHYROIDISM: ICD-10-CM

## 2024-07-08 DIAGNOSIS — D17.21 LIPOMA OF RIGHT UPPER EXTREMITY: Primary | ICD-10-CM

## 2024-07-08 DIAGNOSIS — M85.80 OSTEOPENIA, UNSPECIFIED LOCATION: ICD-10-CM

## 2024-07-08 DIAGNOSIS — E04.9 GOITER: ICD-10-CM

## 2024-07-08 PROBLEM — F32.A DEPRESSION: Status: ACTIVE | Noted: 2024-07-08

## 2024-07-08 PROCEDURE — 1159F MED LIST DOCD IN RCRD: CPT | Performed by: FAMILY MEDICINE

## 2024-07-08 PROCEDURE — 1036F TOBACCO NON-USER: CPT | Performed by: FAMILY MEDICINE

## 2024-07-08 PROCEDURE — 99214 OFFICE O/P EST MOD 30 MIN: CPT | Performed by: FAMILY MEDICINE

## 2024-07-08 RX ORDER — ALENDRONATE SODIUM 70 MG/1
70 TABLET ORAL
Qty: 12 TABLET | Refills: 3 | Status: SHIPPED | OUTPATIENT
Start: 2024-07-08 | End: 2025-07-08

## 2024-07-08 ASSESSMENT — PATIENT HEALTH QUESTIONNAIRE - PHQ9
10. IF YOU CHECKED OFF ANY PROBLEMS, HOW DIFFICULT HAVE THESE PROBLEMS MADE IT FOR YOU TO DO YOUR WORK, TAKE CARE OF THINGS AT HOME, OR GET ALONG WITH OTHER PEOPLE: NOT DIFFICULT AT ALL
3. TROUBLE FALLING OR STAYING ASLEEP OR SLEEPING TOO MUCH: NOT AT ALL
4. FEELING TIRED OR HAVING LITTLE ENERGY: MORE THAN HALF THE DAYS
7. TROUBLE CONCENTRATING ON THINGS, SUCH AS READING THE NEWSPAPER OR WATCHING TELEVISION: NOT AT ALL
2. FEELING DOWN, DEPRESSED OR HOPELESS: MORE THAN HALF THE DAYS
5. POOR APPETITE OR OVEREATING: SEVERAL DAYS
SUM OF ALL RESPONSES TO PHQ9 QUESTIONS 1 AND 2: 3
8. MOVING OR SPEAKING SO SLOWLY THAT OTHER PEOPLE COULD HAVE NOTICED. OR THE OPPOSITE, BEING SO FIGETY OR RESTLESS THAT YOU HAVE BEEN MOVING AROUND A LOT MORE THAN USUAL: NOT AT ALL
9. THOUGHTS THAT YOU WOULD BE BETTER OFF DEAD, OR OF HURTING YOURSELF: NOT AT ALL
6. FEELING BAD ABOUT YOURSELF - OR THAT YOU ARE A FAILURE OR HAVE LET YOURSELF OR YOUR FAMILY DOWN: NOT AT ALL
1. LITTLE INTEREST OR PLEASURE IN DOING THINGS: SEVERAL DAYS
SUM OF ALL RESPONSES TO PHQ QUESTIONS 1-9: 6

## 2024-07-08 ASSESSMENT — ANXIETY QUESTIONNAIRES
GAD7 TOTAL SCORE: 0
5. BEING SO RESTLESS THAT IT IS HARD TO SIT STILL: NOT AT ALL
2. NOT BEING ABLE TO STOP OR CONTROL WORRYING: NOT AT ALL
3. WORRYING TOO MUCH ABOUT DIFFERENT THINGS: NOT AT ALL
4. TROUBLE RELAXING: NOT AT ALL
7. FEELING AFRAID AS IF SOMETHING AWFUL MIGHT HAPPEN: NOT AT ALL
1. FEELING NERVOUS, ANXIOUS, OR ON EDGE: NOT AT ALL
6. BECOMING EASILY ANNOYED OR IRRITABLE: NOT AT ALL

## 2024-07-08 ASSESSMENT — ENCOUNTER SYMPTOMS
DIARRHEA: 1
HYPERACTIVE: 0
FEVER: 0
NERVOUS/ANXIOUS: 0

## 2024-07-08 NOTE — ASSESSMENT & PLAN NOTE
Gait review of bone density dated 7/24 showed T-score -1 0.6-1.4 with results compatible with osteopenia discussed calcium and vitamin D supplementation

## 2024-07-08 NOTE — PROGRESS NOTES
Subjective   Patient ID: Shelley Jones is a 64 y.o. female who presents for Follow-up.  HPI  Lumps  .. Multiple       extremities   Much   better since  last  visit  Had spiritual renewal  and        feels  like on right track..   Would  like to go back     7/16/24  ..   Depression   .. Anxiety      see     screening  Review of Systems   Constitutional:  Negative for fever.   Gastrointestinal:  Positive for diarrhea.   Endocrine: Positive for cold intolerance.   Psychiatric/Behavioral:  The patient is not nervous/anxious and is not hyperactive.      All other  pertinent  systems reviewed and are negative except  those  mentioned  in HPI   Objective   Physical Exam  Vitals: I have reviewed the vitals  General: Well-developed.  In no acute distress.  Eyes:   sclera nonicteric.  Conjunctiva not injected.  No discharge.   HEAD: Normocephalic, atraumatic.  HEENT   Mucous membranes moist.  Posterior oropharynx nonerythematous, no tonsillar exudates.      No cervical lymphadenopathy.  Cardio: Regular rate and rhythm.  No murmur, rub or gallop.  Pulmonary: Lungs clear to auscultation in all fields.  No accessory muscle use.  GI/: Normal active bowel sounds.  Soft, nontender.  No masses or organomegaly appreciated.  Musculoskeletal: No gross deformities appreciated.  Left inguinal area  3.5  cm x   3.ocm    right  mid      humerus  Neuro: Alert, age-appropriate.  Normal muscle tone.  Moving all extremities.  Skin: No rash, bruises or lesions.   Labs  Last 12 months   Lab on 06/13/2024   Component Date Value Ref Range Status    WBC 06/13/2024 5.6  4.4 - 11.3 x10*3/uL Final    nRBC 06/13/2024 0.0  0.0 - 0.0 /100 WBCs Final    RBC 06/13/2024 4.71  4.00 - 5.20 x10*6/uL Final    Hemoglobin 06/13/2024 14.2  12.0 - 16.0 g/dL Final    Hematocrit 06/13/2024 43.0  36.0 - 46.0 % Final    MCV 06/13/2024 91  80 - 100 fL Final    MCH 06/13/2024 30.1  26.0 - 34.0 pg Final    MCHC 06/13/2024 33.0  32.0 - 36.0 g/dL Final    RDW 06/13/2024  11.6  11.5 - 14.5 % Final    Platelets 06/13/2024 249  150 - 450 x10*3/uL Final    Neutrophils % 06/13/2024 65.6  40.0 - 80.0 % Final    Immature Granulocytes %, Automated 06/13/2024 0.2  0.0 - 0.9 % Final    Immature Granulocyte Count (IG) includes promyelocytes, myelocytes and metamyelocytes but does not include bands. Percent differential counts (%) should be interpreted in the context of the absolute cell counts (cells/UL).    Lymphocytes % 06/13/2024 26.1  13.0 - 44.0 % Final    Monocytes % 06/13/2024 5.8  2.0 - 10.0 % Final    Eosinophils % 06/13/2024 1.4  0.0 - 6.0 % Final    Basophils % 06/13/2024 0.9  0.0 - 2.0 % Final    Neutrophils Absolute 06/13/2024 3.64  1.20 - 7.70 x10*3/uL Final    Percent differential counts (%) should be interpreted in the context of the absolute cell counts (cells/uL).    Immature Granulocytes Absolute, Au* 06/13/2024 0.01  0.00 - 0.70 x10*3/uL Final    Lymphocytes Absolute 06/13/2024 1.45  1.20 - 4.80 x10*3/uL Final    Monocytes Absolute 06/13/2024 0.32  0.10 - 1.00 x10*3/uL Final    Eosinophils Absolute 06/13/2024 0.08  0.00 - 0.70 x10*3/uL Final    Basophils Absolute 06/13/2024 0.05  0.00 - 0.10 x10*3/uL Final    Glucose 06/13/2024 99  74 - 99 mg/dL Final    Sodium 06/13/2024 141  136 - 145 mmol/L Final    Potassium 06/13/2024 4.3  3.5 - 5.3 mmol/L Final    Chloride 06/13/2024 103  98 - 107 mmol/L Final    Bicarbonate 06/13/2024 30  21 - 32 mmol/L Final    Anion Gap 06/13/2024 12  10 - 20 mmol/L Final    Urea Nitrogen 06/13/2024 15  6 - 23 mg/dL Final    Creatinine 06/13/2024 0.84  0.50 - 1.05 mg/dL Final    eGFR 06/13/2024 77  >60 mL/min/1.73m*2 Final    Calculations of estimated GFR are performed using the 2021 CKD-EPI Study Refit equation without the race variable for the IDMS-Traceable creatinine methods.  https://jasn.asnjournals.org/content/early/2021/09/22/ASN.1631214667    Calcium 06/13/2024 9.4  8.6 - 10.3 mg/dL Final    Albumin 06/13/2024 4.6  3.4 - 5.0 g/dL Final     Alkaline Phosphatase 06/13/2024 124  33 - 136 U/L Final    Total Protein 06/13/2024 6.8  6.4 - 8.2 g/dL Final    AST 06/13/2024 16  9 - 39 U/L Final    Bilirubin, Total 06/13/2024 0.7  0.0 - 1.2 mg/dL Final    ALT 06/13/2024 15  7 - 45 U/L Final    Patients treated with Sulfasalazine may generate falsely decreased results for ALT.    Triiodothyronine, Free 06/13/2024 4.3 (H)  2.3 - 4.2 pg/mL Final    Thyroxine, Free 06/13/2024 0.95  0.61 - 1.12 ng/dL Final    Thyroid Stimulating Hormone 06/13/2024 0.10 (L)  0.44 - 3.98 mIU/L Final    Cholesterol 06/13/2024 304 (H)  0 - 199 mg/dL Final          Age      Desirable   Borderline High   High     0-19 Y     0 - 169       170 - 199     >/= 200    20-24 Y     0 - 189       190 - 224     >/= 225         >24 Y     0 - 199       200 - 239     >/= 240   **All ranges are based on fasting samples. Specific   therapeutic targets will vary based on patient-specific   cardiac risk.    Pediatric guidelines reference:Pediatrics 2011, 128(S5).Adult guidelines reference: NCEP ATPIII Guidelines,KATHERINE 2001, 258:2486-97    Venipuncture immediately after or during the administration of Metamizole may lead to falsely low results. Testing should be performed immediately prior to Metamizole dosing.    HDL-Cholesterol 06/13/2024 50.6  mg/dL Final      Age       Very Low   Low     Normal    High    0-19 Y    < 35      < 40     40-45     ----  20-24 Y    ----     < 40      >45      ----        >24 Y      ----     < 40     40-60      >60      Cholesterol/HDL Ratio 06/13/2024 6.0   Final      Ref Values  Desirable  < 3.4  High Risk  > 5.0    LDL Calculated 06/13/2024 207 (H)  <=99 mg/dL Final                                Near   Borderline      AGE      Desirable  Optimal    High     High     Very High     0-19 Y     0 - 109     ---    110-129   >/= 130     ----    20-24 Y     0 - 119     ---    120-159   >/= 160     ----      >24 Y     0 -  99   100-129  130-159   160-189     >/=190      VLDL  06/13/2024 46 (H)  0 - 40 mg/dL Final    Triglycerides 06/13/2024 231 (H)  0 - 149 mg/dL Final       Age         Desirable   Borderline High   High     Very High   0 D-90 D    19 - 174         ----         ----        ----  91 D- 9 Y     0 -  74        75 -  99     >/= 100      ----    10-19 Y     0 -  89        90 - 129     >/= 130      ----    20-24 Y     0 - 114       115 - 149     >/= 150      ----         >24 Y     0 - 149       150 - 199    200- 499    >/= 500    Venipuncture immediately after or during the administration of Metamizole may lead to falsely low results. Testing should be performed immediately prior to Metamizole dosing.    Non HDL Cholesterol 06/13/2024 253 (H)  0 - 149 mg/dL Final          Age       Desirable   Borderline High   High     Very High     0-19 Y     0 - 119       120 - 144     >/= 145    >/= 160    20-24 Y     0 - 149       150 - 189     >/= 190      ----         >24 Y    30 mg/dL above LDL Cholesterol goal      WBC, Urine 06/13/2024 1-5  1-5, NONE /HPF Final    RBC, Urine 06/13/2024 1-2  NONE, 1-2, 3-5 /HPF Final    Squamous Epithelial Cells, Urine 06/13/2024 1-9 (SPARSE)  Reference range not established. /HPF Final    Mucus, Urine 06/13/2024 FEW  Reference range not established. /LPF Final    Hyaline Casts, Urine 06/13/2024 1+ (A)  NONE /LPF Final    Urine Culture 06/13/2024 No significant growth   Final    Hepatitis C AB 06/13/2024 Nonreactive  Nonreactive Final    Results from patients taking biotin supplements or receiving high-dose biotin therapy should be interpreted with caution due to possible interference with this test. Providers may contact their local laboratory for further information.    HIV 1/2 Antigen/Antibody Screen wi* 06/13/2024 Nonreactive  Nonreactive Final         Assessment/Plan   Problem List Items Addressed This Visit       Osteopenia     Gait review of bone density dated 7/24 showed T-score -1 0.6-1.4 with results compatible with osteopenia discussed  calcium and vitamin D supplementation         Lipoma of right upper extremity - Primary     To  general surgery   for removal .         Relevant Medications    alendronate (Fosamax) 70 mg tablet    Other Relevant Orders    Referral to General Surgery    Goiter     Patient may have Hashimoto's we will check testing in light of borderline abnormal thyroid test with low TSH and high free T3 will repeat and check antimicrosomal antibody if remains abnormal to endocrinology and referral placed in chart discussed with patient          Other Visit Diagnoses       Hyperthyroidism        Relevant Orders    Thyroid Peroxidase (TPO) Antibody    Thyroid Stimulating Hormone    Triiodothyronine, Free    Referral to Endocrinology                  Mother

## 2024-07-08 NOTE — ASSESSMENT & PLAN NOTE
Patient may have Hashimoto's we will check testing in light of borderline abnormal thyroid test with low TSH and high free T3 will repeat and check antimicrosomal antibody if remains abnormal to endocrinology and referral placed in chart discussed with patient

## 2024-07-08 NOTE — PATIENT INSTRUCTIONS
You can continue on the additional vitamin D supplement of 5000 international units until the end of May of this calendar year .  Starting June.. Please continue with the vitamin D with your multivitamin and calcium supplement, this will be from June to October of ths calendar year    So you can take the 5000 international units of vitamin D from November to March of every year.  The other months you will get vitamin D is you have before with a multivitamin and calcium supplement.  Please take your vitamin D with a handful of water unsalted almonds while with the meal.  Vitamin D is a fat-soluble vitamin that requires fat in the food to be well absorbed.  We recommend healthy fats such as with nuts, seeds, avocados, olives or with a healthy meal.  Also you may notice a multivitamin has some vitamin D.  Spending up to 30 minutes in the sun during the summer in late spring can provide natural vitamin D to the uncovered skin (arm, legs).    Calcium citrate 600 mg once daily and drink 1 cup of plant-based milk twice daily.  The plan based milk can be almond milk, soy milk, etc.    continue  diet with healthy calcium much foods such as:  Healthy foods that are rich in calcium include: Nuts, seeds, legumes/beans, peas, dark green leafy vegetables, plant-based milk  Additional calcium rich foods listed below  -Soaking almonds  overnight in the fridge with drinking water, can help with softening the almonds and improved absorption of the almonds.  If your lab work shows insufficient calcium or you are unable to consume the foods rich in calcium  ...... some supplement is recommended, such as calcium citrate.    Please continue following with your dentist every 6 months  If you are on an osteoporosis medication, please inform a dentist that invasive dental work with these medications can increase once risk for osteonecrosis of the jaw.  Please continue with good oral hygiene and dental care.    Review of osteoporosis  medications  Medications to prevent bone loss and osteoporosis fractures:  -Oral biphosphonate's (such as Actonel, Boniva, Fosamax/alendronate (these are taken either once a week or once a month depending on med dose chosen, first thing in the morning with special instructions to follow.    The duration should be limited to 5 years, to prevent increased risk for atypical fracture of femur. Please consider exercise program involving walking or some other form of aerobic activity 5 days weekly for 30 minutes... Let's also consider strengthening of large muscle groups like the abdominal muscles or shoulder muscles... Twice weekly with reps of 5/10/15 exercises and gradually increase strength.. This is not heavy strength training but light weight training... Sit ups or back exercise routine.. Please ask for handout if uncertain how to do..This  will help to strengthen your muscles which in turn will help you to lose weight.... You might ask what is the best diet available.. I would strongly encourage you to consider  Weight Watchers.. And as  your  fellow on  Weight Watchers physician attempting to  live this  LIFE  style  choice with you....  I will be glad to give you recommendations on what to eat.. Consider buying Jenelle bread.., carlitos bagle thin bread.. oikos yogurt... eggs  to eat as hard-boiled... Halo top ice cream for snack... All these are delicious foods which.. when eaten and  being compliant eating three  meals daily  breakfast lunch and dinner, drinking  64 ounces of water daily we will all win together !!!!!!!. This will be a means for you to lose weight... Consider also the smart phone kevin ... My plate.. Or My  fitness  pal..,  as additional possibilities for weight loss... Phani Prater!    Discussed medication side effects.  The  risk benefits and treatment options  discussed with patient.       Please schedule follow-up appointment based upon your improvement/failure to improve/chronic  medical conditions and physician recommendations during office appointment at the .       Patient advised to go to er if symptoms worsen or to call answering service, or to return to office for additional evaluation    This note was partially  generated using Dragon voice recognition and there may be incorrect words, wording, spelling, or pronunciation errors that were not corrected prior to committing the note to the medical record.

## 2024-07-08 NOTE — ASSESSMENT & PLAN NOTE
With treatment patient is improved.  She predominately spends time off with medication along with supportive care i.e. back to the gym with vitamin supplementation and feels much improved and at this time did not take SSRI and/psychology counseling and encouraged to monitor symptoms and will return to work however if symptoms return then we will move back in the direction of both counseling/medication management

## 2024-07-25 ENCOUNTER — APPOINTMENT (OUTPATIENT)
Dept: SURGERY | Facility: CLINIC | Age: 64
End: 2024-07-25
Payer: COMMERCIAL

## 2024-07-25 VITALS
BODY MASS INDEX: 28.45 KG/M2 | HEIGHT: 66 IN | WEIGHT: 177 LBS | SYSTOLIC BLOOD PRESSURE: 110 MMHG | DIASTOLIC BLOOD PRESSURE: 70 MMHG

## 2024-07-25 DIAGNOSIS — D17.21 LIPOMA OF RIGHT UPPER EXTREMITY: ICD-10-CM

## 2024-07-25 NOTE — PROGRESS NOTES
Subjective   Patient ID: Shelley Jones is a 64 y.o. female who presents for New Patient Visit (Here for multiple lipomas on right upper arm and left thigh. ).  HPI  64-year-old female patient referred to me for right upper extremity lipoma.  She noticed the lipoma years ago but has increased in size. She had multiple smaller ones to left anterior/proximal thigh and bilateral forearms. Denies fevers and chills.     Objective   Physical Exam  Gen: no acute distress  CV: RRR  Pulm: CTAB  Right posterior arm: 2x2 cm lipoma, no skin changes  Assessment/Plan   64-year-old female patient with enlarging right posterior arm lipoma.  She is consented for excision in the office. I explained to her the procedure, risks and complications which include but not limited to infection and wound dehiscence; all questions answered and willing to proceed.          Doyle Banks MD 07/25/24 10:30 AM

## 2024-08-02 ENCOUNTER — APPOINTMENT (OUTPATIENT)
Dept: SURGERY | Facility: CLINIC | Age: 64
End: 2024-08-02
Payer: COMMERCIAL

## 2024-08-02 VITALS — WEIGHT: 177 LBS | BODY MASS INDEX: 28.57 KG/M2

## 2024-08-02 DIAGNOSIS — D17.21 LIPOMA OF RIGHT UPPER EXTREMITY: Primary | ICD-10-CM

## 2024-08-02 PROCEDURE — 12032 INTMD RPR S/A/T/EXT 2.6-7.5: CPT | Performed by: SURGERY

## 2024-08-02 PROCEDURE — 11424 EXC H-F-NK-SP B9+MARG 3.1-4: CPT | Performed by: SURGERY

## 2024-08-02 PROCEDURE — 1159F MED LIST DOCD IN RCRD: CPT | Performed by: SURGERY

## 2024-08-02 PROCEDURE — 1160F RVW MEDS BY RX/DR IN RCRD: CPT | Performed by: SURGERY

## 2024-08-02 PROCEDURE — 88304 TISSUE EXAM BY PATHOLOGIST: CPT

## 2024-08-02 PROCEDURE — 1036F TOBACCO NON-USER: CPT | Performed by: SURGERY

## 2024-08-02 PROCEDURE — 11423 EXC H-F-NK-SP B9+MARG 2.1-3: CPT | Performed by: SURGERY

## 2024-08-02 NOTE — PROGRESS NOTES
Patient ID: Shelley Jones is a 64 y.o. female.    Procedures  Procedure: Excision of TWO right posterior arm lipomas  Indication: Enlarging, painful lipomas  Alternatives, risks and complications including but not limited to bleeding and infection were discussed with the patient. Written consent was obtained prior to the procedure.  After informed consent was obtained, the surrounding skin was prepped and draped in the usual sterile fashion using chloroprep. Two lipomas noted measuring 4x5 cm and 3x4 cm. The surrounding skin was anesthetized with 1% lidocaine with epi ( 40 ml total). Using the 15 blade, a 4 cm transverse incision was made through the skin and subQ tissue down to the lipoma which was dissected free; the specimen measures 4x4 cm. The 4 cm defect closed in layers: deep dermal tissue approximated with multiple 3-0 vicryl in simple interrupted fashion and skin approximated with 3-0 vicryl in subcuticular fashion. Mastisol, sterile strips, 4x4 gauze and Tegaderm placed. Another 3 cm transverse incision was made over the other lipoma carried through skin and subQ tissue and lipoma excised. The specimen measures 2x3 cm. The 3 cm incision was closed in layers: deep dermal tissue approximated with multiple 3-0 vicryl in simple interrupted fashion and skin approximated with 3-0 vicryl in subcuticular fashion. Mastisol, sterile strips, 4x4 gauze and Tegaderm placed. The specimen sent to pathology. There was minimal bleeding and patient tolerated the procedure with no immediate complication. The patient is given instruction about wound care. Remove the dressing in 3 days; remove steri-strips in 1 week. Call for redness, drainage, fevers or worsening pain.

## 2024-08-12 LAB
LABORATORY COMMENT REPORT: NORMAL
PATH REPORT.FINAL DX SPEC: NORMAL
PATH REPORT.GROSS SPEC: NORMAL
PATH REPORT.RELEVANT HX SPEC: NORMAL
PATH REPORT.TOTAL CANCER: NORMAL

## 2024-08-15 ENCOUNTER — APPOINTMENT (OUTPATIENT)
Dept: SURGERY | Facility: CLINIC | Age: 64
End: 2024-08-15
Payer: COMMERCIAL

## 2024-08-15 DIAGNOSIS — Z51.89 VISIT FOR WOUND CHECK: Primary | ICD-10-CM

## 2024-08-15 PROCEDURE — 1159F MED LIST DOCD IN RCRD: CPT | Performed by: SURGERY

## 2024-08-15 PROCEDURE — 1160F RVW MEDS BY RX/DR IN RCRD: CPT | Performed by: SURGERY

## 2024-08-15 PROCEDURE — 99024 POSTOP FOLLOW-UP VISIT: CPT | Performed by: SURGERY

## 2024-08-15 NOTE — PROGRESS NOTES
Subjective   Patient ID: Shelley Jones is a 64 y.o. female who presents for Follow-up (Here for 2 week follow up s/p lipoma removal).  HPI  64-year-old patient who underwent excision of right posterior arm lipoma on August 2.  Pathology showed angiolipoma.  Doing well.    Objective   Physical Exam  Right posterior arm: incision approximated, no infection   Assessment/Plan   Satisfactory postoperative course.  Patient is doing well.  Follow-up as needed         Doyle Banks MD 08/15/24 9:01 AM

## 2025-03-01 ENCOUNTER — OFFICE VISIT (OUTPATIENT)
Dept: URGENT CARE | Age: 65
End: 2025-03-01
Payer: MEDICARE

## 2025-03-01 VITALS
DIASTOLIC BLOOD PRESSURE: 79 MMHG | BODY MASS INDEX: 28.45 KG/M2 | TEMPERATURE: 97.8 F | HEIGHT: 66 IN | HEART RATE: 74 BPM | OXYGEN SATURATION: 96 % | WEIGHT: 177 LBS | RESPIRATION RATE: 15 BRPM | SYSTOLIC BLOOD PRESSURE: 148 MMHG

## 2025-03-01 DIAGNOSIS — J32.9 SINOBRONCHITIS: Primary | ICD-10-CM

## 2025-03-01 DIAGNOSIS — J40 SINOBRONCHITIS: Primary | ICD-10-CM

## 2025-03-01 LAB
POC RAPID INFLUENZA A: NEGATIVE
POC RAPID INFLUENZA B: NEGATIVE
POC RAPID STREP: NEGATIVE
POC SARS-COV-2 AG BINAX: NORMAL

## 2025-03-01 RX ORDER — AMOXICILLIN AND CLAVULANATE POTASSIUM 875; 125 MG/1; MG/1
875 TABLET, FILM COATED ORAL 2 TIMES DAILY
Qty: 20 TABLET | Refills: 0 | Status: SHIPPED | OUTPATIENT
Start: 2025-03-01 | End: 2025-03-11

## 2025-03-01 RX ORDER — AMOXICILLIN AND CLAVULANATE POTASSIUM 875; 125 MG/1; MG/1
875 TABLET, FILM COATED ORAL 2 TIMES DAILY
Qty: 20 TABLET | Refills: 0 | Status: SHIPPED | OUTPATIENT
Start: 2025-03-01 | End: 2025-03-01 | Stop reason: ENTERED-IN-ERROR

## 2025-03-01 RX ORDER — BENZONATATE 200 MG/1
200 CAPSULE ORAL 3 TIMES DAILY PRN
Qty: 30 CAPSULE | Refills: 0 | Status: SHIPPED | OUTPATIENT
Start: 2025-03-01 | End: 2025-03-11

## 2025-03-01 ASSESSMENT — ENCOUNTER SYMPTOMS
HEADACHES: 1
SORE THROAT: 1
COUGH: 1

## 2025-03-01 NOTE — PROGRESS NOTES
Subjective   Patient ID: Shelley Jones is a 65 y.o. female. They present today with a chief complaint of Sore Throat, Cough, Headache, and Sinusitis (OTC meds are not helping has been about 3 days now ).    History of Present Illness  Subjective  Shelley Jones is a 65 y.o. female who presents for evaluation of possible sinus infection. Symptoms include achiness, congestion, coryza, cough described as productive, facial pain, nasal congestion, no  fever, post nasal drip, purulent nasal discharge, and sinus pressure with no fever, chills, night sweats or weight loss. Onset of symptoms was 2 weeks ago, gradually worsening since that time. She is drinking plenty of fluids. Past history is significant for occasional episodes of bronchitis and vaping nicotine.     Review of Systems   Constitutional:  Endorses malaise, fatigue. Denies fever, chills.  ENT: See HPI  Respiratory:  Endorses cough, sputum production.  Denies shortness of breath, wheezing.    Cardiovascular:  Denies chest pain, palpitations, syncope, lightheadedness, dizziness.    Gastrointestinal:  Denies abdominal pain, nausea, vomiting, diarrhea.    Integumentary:  Denies rash.    All other systems are negative        History provided by:  Patient   used: No    Sore Throat   Associated symptoms include coughing and headaches.   Cough  Associated symptoms include headaches and a sore throat.   Headache  Associated symptoms: cough and sore throat    Sinusitis  Associated symptoms: cough, headaches and sore throat        Past Medical History  Allergies as of 03/01/2025 - Reviewed 03/01/2025   Allergen Reaction Noted    Adhesive Itching 11/10/2016    Sulfamethoxazole GI Upset 10/01/2012    Sulfamethoxazole-trimethoprim Diarrhea and Nausea And Vomiting 11/10/2016       (Not in a hospital admission)       Past Medical History:   Diagnosis Date    Alcoholism in recovery (Multi)     Chronic obstructive pulmonary disease with (acute) exacerbation  "(Multi) 07/02/2019    COPD exacerbation    Chronic sinusitis, unspecified     Frequent sinus infections    Nicotine addiction     Personal history of other diseases of the digestive system     History of gallbladder disease    Personal history of other endocrine, nutritional and metabolic disease     History of high cholesterol    Personal history of other specified conditions 07/02/2019    History of diarrhea    Personal history of other specified conditions     History of night sweats    Personal history of other specified conditions     History of weakness       Past Surgical History:   Procedure Laterality Date    OTHER SURGICAL HISTORY  07/02/2019    Hysterectomy    OTHER SURGICAL HISTORY  07/02/2019    Tonsillectomy    OTHER SURGICAL HISTORY  09/17/2021    Renal lithotripsy    OTHER SURGICAL HISTORY  04/07/2021    Cholecystectomy        reports that she has quit smoking. Her smoking use included cigarettes. She has never used smokeless tobacco. She reports that she does not currently use alcohol. She reports that she does not use drugs.    Review of Systems  Review of Systems   HENT:  Positive for sore throat.    Respiratory:  Positive for cough.    Neurological:  Positive for headaches.                                  Objective    Vitals:    03/01/25 1109   BP: 148/79   Pulse: 74   Temp: 36.6 °C (97.8 °F)   SpO2: 96%   Weight: 80.3 kg (177 lb)   Height: 1.676 m (5' 6\")     No LMP recorded. Patient has had a hysterectomy.    Physical Exam  Vitals and nursing note reviewed.   Constitutional:       General: She is not in acute distress.     Appearance: Normal appearance. She is normal weight. She is ill-appearing. She is not toxic-appearing or diaphoretic.   HENT:      Right Ear: Tympanic membrane, ear canal and external ear normal. There is no impacted cerumen.      Left Ear: Tympanic membrane, ear canal and external ear normal. There is no impacted cerumen.      Nose: Congestion and rhinorrhea present. " Rhinorrhea is purulent.      Right Turbinates: Enlarged and swollen.      Left Turbinates: Enlarged and swollen.      Right Sinus: Frontal sinus tenderness present. No maxillary sinus tenderness.      Left Sinus: Frontal sinus tenderness present. No maxillary sinus tenderness.      Mouth/Throat:      Mouth: Mucous membranes are moist.      Pharynx: Posterior oropharyngeal erythema present. No oropharyngeal exudate.   Eyes:      General: No scleral icterus.        Right eye: No discharge.         Left eye: No discharge.      Conjunctiva/sclera: Conjunctivae normal.   Cardiovascular:      Rate and Rhythm: Normal rate and regular rhythm.      Pulses: Normal pulses.      Heart sounds: Normal heart sounds.   Pulmonary:      Effort: Pulmonary effort is normal. No respiratory distress.      Breath sounds: Normal breath sounds. No stridor. No wheezing, rhonchi or rales.   Musculoskeletal:      Cervical back: Normal range of motion and neck supple. No rigidity or tenderness.   Lymphadenopathy:      Cervical: No cervical adenopathy.   Skin:     General: Skin is warm and dry.      Coloration: Skin is not jaundiced or pale.      Findings: No bruising, erythema or rash.   Neurological:      General: No focal deficit present.      Mental Status: She is alert and oriented to person, place, and time.         Procedures    Point of Care Test & Imaging Results from this visit  No results found for this visit on 03/01/25.   No results found.    Diagnostic study results (if any) were reviewed by FRANK Camargo.    Assessment/Plan   Allergies, medications, history, and pertinent labs/EKGs/Imaging reviewed by FRANK Camargo.     Medical Decision Making    ?Based on history of persistent sinus symptoms, likely bacterial sinusitis.?No evidence of Meningitis, OM, Strep or Pneumonia.?Viral swab testing negative. Will start on oral antibiotics today.? Encouraged patient to continue symptomatic and supportive care  measures.? RTC with any new or worsening symptoms.? Discussed when to seek emergent care. Patient verbalized understanding and agreeable with plan.??       Orders and Diagnoses  Diagnoses and all orders for this visit:  Flu-like symptoms  -     POCT rapid strep A manually resulted  -     POCT Influenza A/B manually resulted  -     POCT Covid-19 Rapid Antigen      Medical Admin Record      Patient disposition: Home    Electronically signed by FRANK Camargo  11:25 AM

## 2025-06-26 ENCOUNTER — TELEPHONE (OUTPATIENT)
Dept: PRIMARY CARE | Facility: CLINIC | Age: 65
End: 2025-06-26

## 2025-06-27 ENCOUNTER — TELEPHONE (OUTPATIENT)
Dept: PRIMARY CARE | Facility: CLINIC | Age: 65
End: 2025-06-27

## 2025-06-27 NOTE — TELEPHONE ENCOUNTER
----- Message from Juancarlos Prater sent at 6/25/2025  6:49 PM EDT -----  Regarding: RE: LUNG CANCER SCREENING ORDER REQUEST  Please asked patient to get labs ordered for thyroid last year.  Did have some abnormal lymph nodes on the chest and would like to discuss.  Also would like to discuss repeating CT for lung cancer screening.  Please schedule appointment to do so with me thanks much complete physical  ----- Message -----  From: Marni Sousa RN  Sent: 5/29/2025   7:18 AM EDT  To: Juancarlos Prater,   Subject: LUNG CANCER SCREENING ORDER REQUEST                Shelley Jones is due for an annual CT low dose lung screening on or after 6/29/25 and does not have a follow up order.     If the patient still meets criteria and is interested in pursuing lung cancer screening, could you please place the appropriate order and please be sure to update the smoking history in Epic to reflect the accurate smoking history?     ([id number]) / Z87.891     They can schedule this by calling [phone] or in MY Chart.    If they decline further screening  or no longer meet criteria please let me know and I will remove them from our program database.  Thank you,     Marni Sousa RN  Lung Cancer Screening Navigation Team  [phone]   normal (ped)...

## 2025-06-27 NOTE — TELEPHONE ENCOUNTER
LVMTCB and get scheduled for a fuv in office regarding a yearly [place] lung screen. I left her the number to schedule the CT on her own and let her know that we have he lab paper work available for  or if it is more convenient I can mail it out.